# Patient Record
Sex: MALE | Race: WHITE | NOT HISPANIC OR LATINO | Employment: UNEMPLOYED | URBAN - METROPOLITAN AREA
[De-identification: names, ages, dates, MRNs, and addresses within clinical notes are randomized per-mention and may not be internally consistent; named-entity substitution may affect disease eponyms.]

---

## 2022-11-27 ENCOUNTER — ANESTHESIA (OUTPATIENT)
Dept: SURGERY | Facility: HOSPITAL | Age: 87
End: 2022-11-27
Payer: COMMERCIAL

## 2022-11-27 ENCOUNTER — ANESTHESIA EVENT (OUTPATIENT)
Dept: SURGERY | Facility: HOSPITAL | Age: 87
End: 2022-11-27
Payer: COMMERCIAL

## 2022-11-27 ENCOUNTER — HOSPITAL ENCOUNTER (OUTPATIENT)
Facility: HOSPITAL | Age: 87
Discharge: HOME | End: 2022-11-28
Attending: EMERGENCY MEDICINE | Admitting: INTERNAL MEDICINE
Payer: COMMERCIAL

## 2022-11-27 DIAGNOSIS — R10.31 RIGHT INGUINAL PAIN: ICD-10-CM

## 2022-11-27 DIAGNOSIS — N20.0 NEPHROLITHIASIS: ICD-10-CM

## 2022-11-27 DIAGNOSIS — N13.2 HYDRONEPHROSIS WITH URINARY OBSTRUCTION DUE TO URETERAL CALCULUS: ICD-10-CM

## 2022-11-27 DIAGNOSIS — N20.1 BILATERAL URETERAL CALCULI: Primary | ICD-10-CM

## 2022-11-27 PROBLEM — I48.20 CHRONIC ATRIAL FIBRILLATION: Status: ACTIVE | Noted: 2022-11-27

## 2022-11-27 PROBLEM — Z86.73 HISTORY OF STROKE: Status: ACTIVE | Noted: 2022-11-27

## 2022-11-27 LAB
ALBUMIN SERPL BCP-MCNC: 3.3 G/DL (ref 3.5–5.2)
ALP SERPL-CCNC: 76 U/L (ref 55–135)
ALT SERPL W/O P-5'-P-CCNC: 9 U/L (ref 10–44)
ANION GAP SERPL CALC-SCNC: 10 MMOL/L (ref 8–16)
ANION GAP SERPL CALC-SCNC: 14 MMOL/L (ref 8–16)
AST SERPL-CCNC: 17 U/L (ref 10–40)
BACTERIA #/AREA URNS HPF: ABNORMAL /HPF
BASOPHILS # BLD AUTO: 0.05 K/UL (ref 0–0.2)
BASOPHILS NFR BLD: 0.9 % (ref 0–1.9)
BILIRUB SERPL-MCNC: 0.7 MG/DL (ref 0.1–1)
BILIRUB UR QL STRIP: NEGATIVE
BUN SERPL-MCNC: 23 MG/DL (ref 10–30)
BUN SERPL-MCNC: 27 MG/DL (ref 6–30)
CALCIUM SERPL-MCNC: 10 MG/DL (ref 8.7–10.5)
CHLORIDE SERPL-SCNC: 104 MMOL/L (ref 95–110)
CHLORIDE SERPL-SCNC: 108 MMOL/L (ref 95–110)
CLARITY UR: CLEAR
CO2 SERPL-SCNC: 25 MMOL/L (ref 23–29)
COLOR UR: YELLOW
CREAT SERPL-MCNC: 0.9 MG/DL (ref 0.5–1.4)
CREAT SERPL-MCNC: 0.9 MG/DL (ref 0.5–1.4)
DIFFERENTIAL METHOD: ABNORMAL
EOSINOPHIL # BLD AUTO: 0.3 K/UL (ref 0–0.5)
EOSINOPHIL NFR BLD: 5.5 % (ref 0–8)
ERYTHROCYTE [DISTWIDTH] IN BLOOD BY AUTOMATED COUNT: 12.8 % (ref 11.5–14.5)
EST. GFR  (NO RACE VARIABLE): >60 ML/MIN/1.73 M^2
GLUCOSE SERPL-MCNC: 103 MG/DL (ref 70–110)
GLUCOSE SERPL-MCNC: 99 MG/DL (ref 70–110)
GLUCOSE UR QL STRIP: NEGATIVE
HCT VFR BLD AUTO: 39.7 % (ref 40–54)
HCT VFR BLD CALC: 39 %PCV (ref 36–54)
HGB BLD-MCNC: 13 G/DL (ref 14–18)
HGB UR QL STRIP: ABNORMAL
HYALINE CASTS #/AREA URNS LPF: 1 /LPF
IMM GRANULOCYTES # BLD AUTO: 0 K/UL (ref 0–0.04)
IMM GRANULOCYTES NFR BLD AUTO: 0 % (ref 0–0.5)
KETONES UR QL STRIP: NEGATIVE
LEUKOCYTE ESTERASE UR QL STRIP: NEGATIVE
LIPASE SERPL-CCNC: 23 U/L (ref 4–60)
LYMPHOCYTES # BLD AUTO: 1.8 K/UL (ref 1–4.8)
LYMPHOCYTES NFR BLD: 32.3 % (ref 18–48)
MCH RBC QN AUTO: 29.6 PG (ref 27–31)
MCHC RBC AUTO-ENTMCNC: 32.7 G/DL (ref 32–36)
MCV RBC AUTO: 90 FL (ref 82–98)
MICROSCOPIC COMMENT: ABNORMAL
MONOCYTES # BLD AUTO: 0.5 K/UL (ref 0.3–1)
MONOCYTES NFR BLD: 9.1 % (ref 4–15)
NEUTROPHILS # BLD AUTO: 2.9 K/UL (ref 1.8–7.7)
NEUTROPHILS NFR BLD: 52.2 % (ref 38–73)
NITRITE UR QL STRIP: NEGATIVE
NRBC BLD-RTO: 0 /100 WBC
PH UR STRIP: 6 [PH] (ref 5–8)
PLATELET # BLD AUTO: 206 K/UL (ref 150–450)
PMV BLD AUTO: 9.5 FL (ref 9.2–12.9)
POC IONIZED CALCIUM: 1.31 MMOL/L (ref 1.06–1.42)
POC TCO2 (MEASURED): 27 MMOL/L (ref 23–29)
POTASSIUM BLD-SCNC: 4 MMOL/L (ref 3.5–5.1)
POTASSIUM SERPL-SCNC: 4.3 MMOL/L (ref 3.5–5.1)
PROT SERPL-MCNC: 7.1 G/DL (ref 6–8.4)
PROT UR QL STRIP: ABNORMAL
RBC # BLD AUTO: 4.39 M/UL (ref 4.6–6.2)
RBC #/AREA URNS HPF: 85 /HPF (ref 0–4)
SAMPLE: NORMAL
SODIUM BLD-SCNC: 140 MMOL/L (ref 136–145)
SODIUM SERPL-SCNC: 143 MMOL/L (ref 136–145)
SP GR UR STRIP: 1.02 (ref 1–1.03)
URN SPEC COLLECT METH UR: ABNORMAL
UROBILINOGEN UR STRIP-ACNC: NEGATIVE EU/DL
WBC # BLD AUTO: 5.63 K/UL (ref 3.9–12.7)
WBC #/AREA URNS HPF: 10 /HPF (ref 0–5)

## 2022-11-27 PROCEDURE — 36000706: Performed by: STUDENT IN AN ORGANIZED HEALTH CARE EDUCATION/TRAINING PROGRAM

## 2022-11-27 PROCEDURE — 85014 HEMATOCRIT: CPT

## 2022-11-27 PROCEDURE — G0378 HOSPITAL OBSERVATION PER HR: HCPCS

## 2022-11-27 PROCEDURE — 82365 CALCULUS SPECTROSCOPY: CPT | Mod: 91 | Performed by: INTERNAL MEDICINE

## 2022-11-27 PROCEDURE — 85025 COMPLETE CBC W/AUTO DIFF WBC: CPT | Performed by: PHYSICIAN ASSISTANT

## 2022-11-27 PROCEDURE — 63600175 PHARM REV CODE 636 W HCPCS: Performed by: ANESTHESIOLOGY

## 2022-11-27 PROCEDURE — 99223 1ST HOSP IP/OBS HIGH 75: CPT | Mod: 57,,, | Performed by: STUDENT IN AN ORGANIZED HEALTH CARE EDUCATION/TRAINING PROGRAM

## 2022-11-27 PROCEDURE — C2617 STENT, NON-COR, TEM W/O DEL: HCPCS | Performed by: STUDENT IN AN ORGANIZED HEALTH CARE EDUCATION/TRAINING PROGRAM

## 2022-11-27 PROCEDURE — C1769 GUIDE WIRE: HCPCS | Performed by: STUDENT IN AN ORGANIZED HEALTH CARE EDUCATION/TRAINING PROGRAM

## 2022-11-27 PROCEDURE — 83690 ASSAY OF LIPASE: CPT | Performed by: PHYSICIAN ASSISTANT

## 2022-11-27 PROCEDURE — 88300 PR  SURG PATH,GROSS,LEVEL I: ICD-10-PCS | Mod: 26,,, | Performed by: PATHOLOGY

## 2022-11-27 PROCEDURE — 63600175 PHARM REV CODE 636 W HCPCS: Performed by: STUDENT IN AN ORGANIZED HEALTH CARE EDUCATION/TRAINING PROGRAM

## 2022-11-27 PROCEDURE — 27201423 OPTIME MED/SURG SUP & DEVICES STERILE SUPPLY: Performed by: STUDENT IN AN ORGANIZED HEALTH CARE EDUCATION/TRAINING PROGRAM

## 2022-11-27 PROCEDURE — D9220A PRA ANESTHESIA: Mod: CRNA,,, | Performed by: NURSE ANESTHETIST, CERTIFIED REGISTERED

## 2022-11-27 PROCEDURE — 25000003 PHARM REV CODE 250: Performed by: ANESTHESIOLOGY

## 2022-11-27 PROCEDURE — 37000009 HC ANESTHESIA EA ADD 15 MINS: Performed by: STUDENT IN AN ORGANIZED HEALTH CARE EDUCATION/TRAINING PROGRAM

## 2022-11-27 PROCEDURE — 82330 ASSAY OF CALCIUM: CPT

## 2022-11-27 PROCEDURE — 25000003 PHARM REV CODE 250: Performed by: NURSE ANESTHETIST, CERTIFIED REGISTERED

## 2022-11-27 PROCEDURE — D9220A PRA ANESTHESIA: Mod: ANES,,, | Performed by: ANESTHESIOLOGY

## 2022-11-27 PROCEDURE — 21400001 HC TELEMETRY ROOM

## 2022-11-27 PROCEDURE — 88300 SURGICAL PATH GROSS: CPT | Performed by: PATHOLOGY

## 2022-11-27 PROCEDURE — 63600175 PHARM REV CODE 636 W HCPCS: Performed by: NURSE ANESTHETIST, CERTIFIED REGISTERED

## 2022-11-27 PROCEDURE — 74420 UROGRAPHY RTRGR +-KUB: CPT | Mod: 26,,, | Performed by: STUDENT IN AN ORGANIZED HEALTH CARE EDUCATION/TRAINING PROGRAM

## 2022-11-27 PROCEDURE — 80053 COMPREHEN METABOLIC PANEL: CPT | Performed by: PHYSICIAN ASSISTANT

## 2022-11-27 PROCEDURE — 74420 PR  X-RAY RETROGRADE PYELOGRAM: ICD-10-PCS | Mod: 26,,, | Performed by: STUDENT IN AN ORGANIZED HEALTH CARE EDUCATION/TRAINING PROGRAM

## 2022-11-27 PROCEDURE — 36000707: Performed by: STUDENT IN AN ORGANIZED HEALTH CARE EDUCATION/TRAINING PROGRAM

## 2022-11-27 PROCEDURE — 82365 CALCULUS SPECTROSCOPY: CPT | Performed by: STUDENT IN AN ORGANIZED HEALTH CARE EDUCATION/TRAINING PROGRAM

## 2022-11-27 PROCEDURE — 84132 ASSAY OF SERUM POTASSIUM: CPT

## 2022-11-27 PROCEDURE — 99285 EMERGENCY DEPT VISIT HI MDM: CPT | Mod: 25

## 2022-11-27 PROCEDURE — 52356 CYSTO/URETERO W/LITHOTRIPSY: CPT | Mod: RT,,, | Performed by: STUDENT IN AN ORGANIZED HEALTH CARE EDUCATION/TRAINING PROGRAM

## 2022-11-27 PROCEDURE — 81000 URINALYSIS NONAUTO W/SCOPE: CPT | Performed by: PHYSICIAN ASSISTANT

## 2022-11-27 PROCEDURE — 82565 ASSAY OF CREATININE: CPT

## 2022-11-27 PROCEDURE — 99223 PR INITIAL HOSPITAL CARE,LEVL III: ICD-10-PCS | Mod: 57,,, | Performed by: STUDENT IN AN ORGANIZED HEALTH CARE EDUCATION/TRAINING PROGRAM

## 2022-11-27 PROCEDURE — 99900035 HC TECH TIME PER 15 MIN (STAT)

## 2022-11-27 PROCEDURE — 88300 SURGICAL PATH GROSS: CPT | Mod: 26,,, | Performed by: PATHOLOGY

## 2022-11-27 PROCEDURE — 87086 URINE CULTURE/COLONY COUNT: CPT | Performed by: STUDENT IN AN ORGANIZED HEALTH CARE EDUCATION/TRAINING PROGRAM

## 2022-11-27 PROCEDURE — 37000008 HC ANESTHESIA 1ST 15 MINUTES: Performed by: STUDENT IN AN ORGANIZED HEALTH CARE EDUCATION/TRAINING PROGRAM

## 2022-11-27 PROCEDURE — 84295 ASSAY OF SERUM SODIUM: CPT

## 2022-11-27 PROCEDURE — 52356 PR CYSTO/URETERO W/LITHOTRIPSY: ICD-10-PCS | Mod: RT,,, | Performed by: STUDENT IN AN ORGANIZED HEALTH CARE EDUCATION/TRAINING PROGRAM

## 2022-11-27 PROCEDURE — D9220A PRA ANESTHESIA: ICD-10-PCS | Mod: ANES,,, | Performed by: ANESTHESIOLOGY

## 2022-11-27 PROCEDURE — 71000033 HC RECOVERY, INTIAL HOUR: Performed by: STUDENT IN AN ORGANIZED HEALTH CARE EDUCATION/TRAINING PROGRAM

## 2022-11-27 PROCEDURE — C1758 CATHETER, URETERAL: HCPCS | Performed by: STUDENT IN AN ORGANIZED HEALTH CARE EDUCATION/TRAINING PROGRAM

## 2022-11-27 PROCEDURE — 63600175 PHARM REV CODE 636 W HCPCS: Performed by: INTERNAL MEDICINE

## 2022-11-27 PROCEDURE — 25000003 PHARM REV CODE 250: Performed by: INTERNAL MEDICINE

## 2022-11-27 PROCEDURE — 25500020 PHARM REV CODE 255: Performed by: STUDENT IN AN ORGANIZED HEALTH CARE EDUCATION/TRAINING PROGRAM

## 2022-11-27 PROCEDURE — D9220A PRA ANESTHESIA: ICD-10-PCS | Mod: CRNA,,, | Performed by: NURSE ANESTHETIST, CERTIFIED REGISTERED

## 2022-11-27 DEVICE — STENT URET PERCUFLEX 6FR 26CM: Type: IMPLANTABLE DEVICE | Site: URETER | Status: FUNCTIONAL

## 2022-11-27 DEVICE — STENT URET PERCUFLEX 6FR 24CM: Type: IMPLANTABLE DEVICE | Site: URETER | Status: FUNCTIONAL

## 2022-11-27 RX ORDER — FENTANYL CITRATE 50 UG/ML
INJECTION, SOLUTION INTRAMUSCULAR; INTRAVENOUS
Status: DISCONTINUED | OUTPATIENT
Start: 2022-11-27 | End: 2022-11-27

## 2022-11-27 RX ORDER — MUPIROCIN 20 MG/G
OINTMENT TOPICAL 2 TIMES DAILY
Status: DISCONTINUED | OUTPATIENT
Start: 2022-11-27 | End: 2022-11-28 | Stop reason: HOSPADM

## 2022-11-27 RX ORDER — SODIUM CHLORIDE 9 MG/ML
INJECTION, SOLUTION INTRAVENOUS CONTINUOUS
Status: DISCONTINUED | OUTPATIENT
Start: 2022-11-27 | End: 2022-11-28 | Stop reason: HOSPADM

## 2022-11-27 RX ORDER — ACETAMINOPHEN 10 MG/ML
1000 INJECTION, SOLUTION INTRAVENOUS ONCE
Status: COMPLETED | OUTPATIENT
Start: 2022-11-27 | End: 2022-11-27

## 2022-11-27 RX ORDER — HYDRALAZINE HYDROCHLORIDE 20 MG/ML
10 INJECTION INTRAMUSCULAR; INTRAVENOUS EVERY 6 HOURS PRN
Status: DISCONTINUED | OUTPATIENT
Start: 2022-11-27 | End: 2022-11-28 | Stop reason: HOSPADM

## 2022-11-27 RX ORDER — ONDANSETRON 2 MG/ML
4 INJECTION INTRAMUSCULAR; INTRAVENOUS EVERY 6 HOURS PRN
Status: DISCONTINUED | OUTPATIENT
Start: 2022-11-27 | End: 2022-11-28 | Stop reason: HOSPADM

## 2022-11-27 RX ORDER — SODIUM CHLORIDE 0.9 % (FLUSH) 0.9 %
10 SYRINGE (ML) INJECTION
Status: DISCONTINUED | OUTPATIENT
Start: 2022-11-27 | End: 2022-11-27 | Stop reason: HOSPADM

## 2022-11-27 RX ORDER — KETOROLAC TROMETHAMINE 30 MG/ML
15 INJECTION, SOLUTION INTRAMUSCULAR; INTRAVENOUS ONCE
Status: COMPLETED | OUTPATIENT
Start: 2022-11-27 | End: 2022-11-27

## 2022-11-27 RX ORDER — ROCURONIUM BROMIDE 10 MG/ML
INJECTION, SOLUTION INTRAVENOUS
Status: DISCONTINUED | OUTPATIENT
Start: 2022-11-27 | End: 2022-11-27

## 2022-11-27 RX ORDER — CEFAZOLIN SODIUM 1 G/3ML
INJECTION, POWDER, FOR SOLUTION INTRAMUSCULAR; INTRAVENOUS
Status: DISCONTINUED | OUTPATIENT
Start: 2022-11-27 | End: 2022-11-27

## 2022-11-27 RX ORDER — DEXAMETHASONE SODIUM PHOSPHATE 4 MG/ML
INJECTION, SOLUTION INTRA-ARTICULAR; INTRALESIONAL; INTRAMUSCULAR; INTRAVENOUS; SOFT TISSUE
Status: DISCONTINUED | OUTPATIENT
Start: 2022-11-27 | End: 2022-11-27

## 2022-11-27 RX ORDER — SUCCINYLCHOLINE CHLORIDE 20 MG/ML
INJECTION INTRAMUSCULAR; INTRAVENOUS
Status: DISCONTINUED | OUTPATIENT
Start: 2022-11-27 | End: 2022-11-27

## 2022-11-27 RX ORDER — ASPIRIN 81 MG/1
81 TABLET ORAL DAILY
Status: DISCONTINUED | OUTPATIENT
Start: 2022-11-27 | End: 2022-11-28 | Stop reason: HOSPADM

## 2022-11-27 RX ORDER — EPHEDRINE SULFATE 50 MG/ML
INJECTION, SOLUTION INTRAVENOUS
Status: DISCONTINUED | OUTPATIENT
Start: 2022-11-27 | End: 2022-11-27

## 2022-11-27 RX ORDER — OXYCODONE AND ACETAMINOPHEN 7.5; 325 MG/1; MG/1
1 TABLET ORAL EVERY 4 HOURS PRN
Status: DISCONTINUED | OUTPATIENT
Start: 2022-11-27 | End: 2022-11-28 | Stop reason: HOSPADM

## 2022-11-27 RX ORDER — PROPOFOL 10 MG/ML
VIAL (ML) INTRAVENOUS
Status: DISCONTINUED | OUTPATIENT
Start: 2022-11-27 | End: 2022-11-27

## 2022-11-27 RX ORDER — TAMSULOSIN HYDROCHLORIDE 0.4 MG/1
0.4 CAPSULE ORAL DAILY
Status: DISCONTINUED | OUTPATIENT
Start: 2022-11-27 | End: 2022-11-28 | Stop reason: HOSPADM

## 2022-11-27 RX ORDER — ATORVASTATIN CALCIUM 10 MG/1
20 TABLET, FILM COATED ORAL DAILY
Status: DISCONTINUED | OUTPATIENT
Start: 2022-11-27 | End: 2022-11-28 | Stop reason: HOSPADM

## 2022-11-27 RX ORDER — METHOCARBAMOL 500 MG/1
500 TABLET, FILM COATED ORAL ONCE
Status: COMPLETED | OUTPATIENT
Start: 2022-11-27 | End: 2022-11-27

## 2022-11-27 RX ORDER — ENOXAPARIN SODIUM 100 MG/ML
40 INJECTION SUBCUTANEOUS EVERY 24 HOURS
Status: DISCONTINUED | OUTPATIENT
Start: 2022-11-27 | End: 2022-11-27

## 2022-11-27 RX ORDER — PHENYLEPHRINE HYDROCHLORIDE 10 MG/ML
INJECTION INTRAVENOUS
Status: DISCONTINUED | OUTPATIENT
Start: 2022-11-27 | End: 2022-11-27

## 2022-11-27 RX ORDER — LIDOCAINE HYDROCHLORIDE 20 MG/ML
INJECTION INTRAVENOUS
Status: DISCONTINUED | OUTPATIENT
Start: 2022-11-27 | End: 2022-11-27

## 2022-11-27 RX ORDER — ONDANSETRON 2 MG/ML
INJECTION INTRAMUSCULAR; INTRAVENOUS
Status: DISCONTINUED | OUTPATIENT
Start: 2022-11-27 | End: 2022-11-27

## 2022-11-27 RX ADMIN — SUGAMMADEX 200 MG: 100 INJECTION, SOLUTION INTRAVENOUS at 03:11

## 2022-11-27 RX ADMIN — SUCCINYLCHOLINE CHLORIDE 100 MG: 20 INJECTION, SOLUTION INTRAMUSCULAR; INTRAVENOUS at 02:11

## 2022-11-27 RX ADMIN — PHENYLEPHRINE HYDROCHLORIDE 100 MCG: 10 INJECTION INTRAVENOUS at 03:11

## 2022-11-27 RX ADMIN — KETOROLAC TROMETHAMINE 15 MG: 30 INJECTION, SOLUTION INTRAMUSCULAR at 04:11

## 2022-11-27 RX ADMIN — EPHEDRINE SULFATE 5 MG: 50 INJECTION INTRAVENOUS at 03:11

## 2022-11-27 RX ADMIN — DEXAMETHASONE SODIUM PHOSPHATE 4 MG: 4 INJECTION, SOLUTION INTRAMUSCULAR; INTRAVENOUS at 02:11

## 2022-11-27 RX ADMIN — APIXABAN 2.5 MG: 2.5 TABLET, FILM COATED ORAL at 09:11

## 2022-11-27 RX ADMIN — EPHEDRINE SULFATE 10 MG: 50 INJECTION INTRAVENOUS at 02:11

## 2022-11-27 RX ADMIN — ROCURONIUM BROMIDE 25 MG: 10 INJECTION, SOLUTION INTRAVENOUS at 02:11

## 2022-11-27 RX ADMIN — PHENYLEPHRINE HYDROCHLORIDE 100 MCG: 10 INJECTION INTRAVENOUS at 02:11

## 2022-11-27 RX ADMIN — PROPOFOL 40 MG: 10 INJECTION, EMULSION INTRAVENOUS at 02:11

## 2022-11-27 RX ADMIN — FENTANYL CITRATE 50 MCG: 50 INJECTION, SOLUTION INTRAMUSCULAR; INTRAVENOUS at 02:11

## 2022-11-27 RX ADMIN — SODIUM CHLORIDE, SODIUM LACTATE, POTASSIUM CHLORIDE, AND CALCIUM CHLORIDE: .6; .31; .03; .02 INJECTION, SOLUTION INTRAVENOUS at 02:11

## 2022-11-27 RX ADMIN — MUPIROCIN: 20 OINTMENT TOPICAL at 09:11

## 2022-11-27 RX ADMIN — LIDOCAINE HYDROCHLORIDE 100 MG: 20 INJECTION, SOLUTION INTRAVENOUS at 02:11

## 2022-11-27 RX ADMIN — PHENYLEPHRINE HYDROCHLORIDE 200 MCG: 10 INJECTION INTRAVENOUS at 03:11

## 2022-11-27 RX ADMIN — PIPERACILLIN AND TAZOBACTAM 4.5 G: 4; .5 INJECTION, POWDER, LYOPHILIZED, FOR SOLUTION INTRAVENOUS; PARENTERAL at 09:11

## 2022-11-27 RX ADMIN — CEFAZOLIN SODIUM 2 G: 1 POWDER, FOR SOLUTION INTRAMUSCULAR; INTRAVENOUS at 02:11

## 2022-11-27 RX ADMIN — EPHEDRINE SULFATE 5 MG: 50 INJECTION INTRAVENOUS at 02:11

## 2022-11-27 RX ADMIN — ONDANSETRON 4 MG: 2 INJECTION, SOLUTION INTRAMUSCULAR; INTRAVENOUS at 02:11

## 2022-11-27 RX ADMIN — METHOCARBAMOL 500 MG: 500 TABLET ORAL at 04:11

## 2022-11-27 RX ADMIN — SODIUM CHLORIDE: 0.9 INJECTION, SOLUTION INTRAVENOUS at 06:11

## 2022-11-27 RX ADMIN — EPHEDRINE SULFATE 10 MG: 50 INJECTION INTRAVENOUS at 03:11

## 2022-11-27 RX ADMIN — ACETAMINOPHEN 1000 MG: 10 INJECTION INTRAVENOUS at 04:11

## 2022-11-27 NOTE — SUBJECTIVE & OBJECTIVE
Past Medical History:   Diagnosis Date    A-fib     CVA (cerebral vascular accident)     Kidney stone        History reviewed. No pertinent surgical history.    Review of patient's allergies indicates:  No Known Allergies    No current facility-administered medications on file prior to encounter.     No current outpatient medications on file prior to encounter.     Family History    None       Tobacco Use    Smoking status: Never    Smokeless tobacco: Never   Substance and Sexual Activity    Alcohol use: Not Currently    Drug use: Never    Sexual activity: Not on file     Review of Systems   Constitutional:  Negative for activity change, appetite change, chills, diaphoresis and fever.   HENT:  Negative for congestion.    Eyes:  Negative for discharge and itching.   Respiratory:  Negative for apnea and chest tightness.    Cardiovascular:  Negative for chest pain and leg swelling.   Gastrointestinal:  Negative for abdominal distention and anal bleeding.   Endocrine: Negative for cold intolerance.   Genitourinary:  Negative for difficulty urinating and dysuria.   Musculoskeletal:  Negative for arthralgias and back pain.   Neurological:  Negative for dizziness and facial asymmetry.   Psychiatric/Behavioral:  Negative for agitation and behavioral problems.    Objective:     Vital Signs (Most Recent):  Temp: 97.5 °F (36.4 °C) (11/27/22 1045)  Pulse: 70 (11/27/22 1330)  Resp: 18 (11/27/22 1045)  BP: (!) 145/67 (11/27/22 1303)  SpO2: 98 % (11/27/22 1330)   Vital Signs (24h Range):  Temp:  [97.5 °F (36.4 °C)] 97.5 °F (36.4 °C)  Pulse:  [65-87] 70  Resp:  [18] 18  SpO2:  [95 %-99 %] 98 %  BP: (130-149)/(61-80) 145/67     Weight: 64.4 kg (142 lb)  Body mass index is 22.92 kg/m².    Physical Exam  Vitals and nursing note reviewed.   Constitutional:       General: He is not in acute distress.     Appearance: Normal appearance. He is not ill-appearing or toxic-appearing.   HENT:      Head: Normocephalic and atraumatic.       Mouth/Throat:      Pharynx: Oropharynx is clear.   Eyes:      Conjunctiva/sclera: Conjunctivae normal.   Cardiovascular:      Rate and Rhythm: Regular rhythm.   Pulmonary:      Effort: Pulmonary effort is normal. No respiratory distress.   Skin:     General: Skin is dry.   Neurological:      Mental Status: He is alert and oriented to person, place, and time.   Psychiatric:         Behavior: Behavior normal.           Significant Labs: All pertinent labs within the past 24 hours have been reviewed.  BMP:   Recent Labs   Lab 11/27/22  1109         K 4.3      CO2 25   BUN 23   CREATININE 0.9   CALCIUM 10.0     CBC:   Recent Labs   Lab 11/27/22  1109 11/27/22  1114   WBC 5.63  --    HGB 13.0*  --    HCT 39.7* 39     --        Significant Imaging:

## 2022-11-27 NOTE — ASSESSMENT & PLAN NOTE
Non obstructing L renal stone  R obstructing ureteral stone with hydronephrosis down to the ureterovesical junction  Discussed treatment of R ureteral stone with stent placement bilaterally  Discussed left renal stone can be managed at a later date  Urine culture  Informed consent  Plan for cystoscopy bilateral retrograde pyelogram, right ureteroscopic stone removal, bilateral stent placement

## 2022-11-27 NOTE — CONSULTS
[facility] - [place], [place] Emergency Dept  Urology  Consult Note    Patient Name: John Puri  MRN: [id number]  Admission Date: 11/27/2022  Hospital Length of Stay: 0   Code Status: No Order   Attending Provider: Lb Godinez MD   Consulting Provider: Noelle Heredia MD  Primary Care Physician: No primary care provider on file.  Principal Problem:Renal calculus    Inpatient consult to Urology  Consult performed by: Noelle Heredia MD  Consult ordered by: Pedro Dee MD  Reason for consult: urolithiasis        Subjective:     HPI:  89 yo man presents to ED with R hip pain. Found to have obstructing R distal ureteral stone for which urology was consulted. He has preserved renal function, no evidence of infection or sepsis. UA without infection. Patient also found to have Left non obstructing stone.   Hx of CVA w/ R sided deficits, afib on aspirin/apixiban  Last meal at 8am approximately      Past Medical History:   Diagnosis Date    A-fib     CVA (cerebral vascular accident)     Kidney stone        History reviewed. No pertinent surgical history.    Review of patient's allergies indicates:  No Known Allergies    Family History    None         Tobacco Use    Smoking status: Never    Smokeless tobacco: Never   Substance and Sexual Activity    Alcohol use: Not Currently    Drug use: Never    Sexual activity: Not on file       Review of Systems   Constitutional:  Negative for activity change, appetite change, chills, diaphoresis and fever.   HENT:  Negative for congestion and rhinorrhea.    Eyes:  Negative for visual disturbance.   Respiratory:  Negative for choking and shortness of breath.    Gastrointestinal:  Positive for abdominal pain. Negative for abdominal distention, constipation, diarrhea, nausea and vomiting.   Genitourinary:  Negative for difficulty urinating, dysuria, flank pain, hematuria, scrotal swelling, testicular pain and urgency.   Skin:  Negative for color change, pallor and wound.   Neurological:   Positive for weakness. Negative for dizziness and syncope.   Psychiatric/Behavioral:  Negative for confusion and hallucinations.      Objective:     Temp:  [97.5 °F (36.4 °C)] 97.5 °F (36.4 °C)  Pulse:  [65-87] 68  Resp:  [18] 18  SpO2:  [95 %-99 %] 96 %  BP: (130-149)/(61-80) 145/67     Body mass index is 22.92 kg/m².           Drains       None                   Physical Exam  Constitutional:       General: He is not in acute distress.     Appearance: He is well-developed. He is not ill-appearing, toxic-appearing or diaphoretic.   HENT:      Head: Normocephalic and atraumatic.      Mouth/Throat:      Mouth: Mucous membranes are moist.   Eyes:      Conjunctiva/sclera: Conjunctivae normal.   Cardiovascular:      Rate and Rhythm: Normal rate.   Pulmonary:      Effort: Pulmonary effort is normal. No respiratory distress.   Abdominal:      General: There is no distension.      Palpations: Abdomen is soft. There is no mass.      Tenderness: There is no abdominal tenderness. There is no right CVA tenderness, left CVA tenderness or guarding.   Musculoskeletal:         General: No swelling or deformity.      Cervical back: Neck supple.   Skin:     General: Skin is warm.      Capillary Refill: Capillary refill takes less than 2 seconds.      Findings: No rash.   Neurological:      Mental Status: He is alert and oriented to person, place, and time.   Psychiatric:         Mood and Affect: Mood normal.         Thought Content: Thought content normal.         Judgment: Judgment normal.       Significant Labs:    BMP:  Recent Labs   Lab 11/27/22  1109      K 4.3      CO2 25   BUN 23   CREATININE 0.9   CALCIUM 10.0       CBC:  Recent Labs   Lab 11/27/22  1109 11/27/22  1114   WBC 5.63  --    HGB 13.0*  --    HCT 39.7* 39     --        Significant Imaging:  Narrative & Impression  EXAMINATION:  CT ABDOMEN PELVIS WITHOUT CONTRAST     CLINICAL HISTORY:  RLQ abdominal pain (Age >= 14y);Right inguinal pain;      TECHNIQUE:  Low dose axial images, sagittal and coronal reformations were obtained from the lung bases to the pubic symphysis.  No contrast media was utilized.     COMPARISON:  None     FINDINGS:  Lack of IV contrast limits evaluation of soft tissue and vascular structures.     Imaged lung bases show mild dependent atelectasis, scattered areas of platelike scarring versus atelectasis, mild bronchiectasis, subpleural reticulation bilaterally and a few small peripheral cysts some of which appear stacked within the right lower lobe suggesting honeycombing.  There are several scattered small solid pulmonary nodules with the largest measuring 5 mm within the posterolateral aspect of the lingula.  No large consolidation or pleural effusion.     Heart is mildly enlarged without significant pericardial fluid noting multi-vessel advanced coronary arterial calcific atherosclerosis and aortic annular and aortic valvular calcifications.     Small hiatal hernia.  There is a suspected small duodenal diverticulum at the C-loop.  Duodenum is otherwise within normal limits.     Right upper quadrant colonic interposition.  There is a solitary 1.4 cm dependent stone within the gallbladder without acute cholecystitis.  Pancreas is mildly atrophic without focal process or adjacent inflammatory change.  Liver is normal in size without focal process seen.  Spleen is somewhat small in size without focal process seen.  Bilateral adrenal glands are within normal limits.  No biliary ductal dilatation.     Bilateral kidneys are normal in location.  There is moderate to severe right-sided hydroureteronephrosis secondary to a 7-8 mm calculus within the distal right ureter at the level of the acetabulum.  There is suspected bilateral mild cortical thinning, allowing for lack of IV contrast.  The right kidney is slightly longer in length than the left presumably related to hydronephrosis.  Left kidney is normal in length.  There is bilateral  mild cortical scarring.  Bilateral mild nonspecific perinephric stranding slightly more prominent on the right.  Few scattered punctate nephroliths at the right renal upper pole.  There is minimal left-sided hydronephrosis noting a 1.7 cm calculus within the left renal pelvis near the ureteropelvic junction (UPJ).  A few scattered small nephroliths throughout the left kidney with the largest measuring 2 mm at the upper pole.  No radiodense calculus seen within the left ureter or urinary bladder.  Left ureter is normal in course and caliber.     Urinary bladder is well distended without wall thickening.  Prostate is normal in size noting dystrophic calcifications in the central gland.  A few scattered pelvic phleboliths noted.     Appendix and terminal ileum are within normal limits.  Moderate amount of scattered fecal material throughout the colon.  Several scattered colonic diverticula without acute diverticulitis.  No bowel obstruction or acute inflammation.  No pneumatosis or portal venous gas.     No ascites, free air or lymphadenopathy.     Advanced scattered calcific atherosclerosis of the aorta extending into its mesenteric and iliac branches.  The aorta is mildly tortuous but nonaneurysmal.     Small fat containing umbilical hernia and small fat containing right inguinal hernia.  Small focus of subcutaneous stranding with a somewhat bandlike configuration within the right gluteal subcutaneous fat likely sequela of remote trauma or surgery.  No fluid collection or radiodense retained foreign body within the surrounding extraperitoneal soft tissues.     Osseous structures show generalized osteopenia, levocurvature of the lumbar spine, moderate to advanced degenerative changes of the imaged thoracic and lumbar spine, degenerative related grade 1 anterolisthesis of 4th on 5th of total 6 lumbar type vertebral bodies, and multilevel chronic appearing minimal to mild anterior wedge deformity of several mid to lower  thoracic and also upper lumbar vertebral bodies.  No acute or destructive osseous process seen.     Impression:     1. Moderate to severe right-sided hydroureteronephrosis secondary to a 7-8 mm calculus within the distal right ureter.  Minimal left-sided hydronephrosis likely secondary to a 1.7 cm within the renal pelvis near the UPJ.  Bilateral nephrolithiasis.  2. Cholelithiasis without acute cholecystitis.  3. Diverticulosis coli.  4. Advanced systemic and coronary calcific atherosclerosis.  5. Bibasilar suspected chronic interstitial lung changes/pulmonary fibrosis without consolidative process.  There is also peribronchial thickening with bronchiectasis which can be seen with recent or remote/chronic small airways infectious or inflammatory process, with small component of pulmonary edema not excluded.  6. Bibasilar several small solid pulmonary nodules measuring up to 5 mm.  For multiple solid nodules all <6 mm, Fleischner Society 2017 guidelines recommend no routine follow up for a low risk patient, or follow up with non-contrast chest CT at 12 months after discovery in a high risk patient.  7. Additional findings as above.  This report was flagged in Epic as abnormal.  This report was flagged in Epic as containing an incidental finding.        Electronically signed by: Maxim Rene MD  Date:                                            11/27/2022  Time:                                           12:32                  Assessment and Plan:     Right ureteral stone  Non obstructing L renal stone  R obstructing ureteral stone with hydronephrosis down to the ureterovesical junction  Discussed treatment of R ureteral stone with stent placement bilaterally  Discussed left renal stone can be managed at a later date  Urine culture  Informed consent  Plan for cystoscopy bilateral retrograde pyelogram, right ureteroscopic stone removal, bilateral stent placement      VTE Risk Mitigation (From admission, onward)            Ordered     apixaban tablet 2.5 mg  2 times daily         11/27/22 4148                    Thank you for your consult. I will follow-up with patient. Please contact us if you have any additional questions.    Noelle Heredia MD  Urology  [facility] - [place] - Emergency Dept

## 2022-11-27 NOTE — OP NOTE
DATE OF PROCEDURE: 11/27/2022     PREOPERATIVE DIAGNOSIS: Bilateral nephrolithiasis, right ureteral stone, hydronephrosis secondary to ureteral obstruction, chronic anticoagulation      POSTOPERATIVE DIAGNOSIS: as above     PROCEDURE PERFORMED:    Cystoscopy with Bilateral retrograde pyelogram,   Right ureteroscopic stone removal with  laser lithotripsy, Rightstone manipulation and extraction  Bilateral ureteral stent placement, Fluoroscopy      PRIMARY SURGEON:  Noelle Heredia MD  Assistant: None     ANESTHESIA:  General.     ESTIMATED BLOOD LOSS:  Minimal, < 2cc     DRAINS:    Right- 6 fr x 26 cm ureteral stent  Left- 6 fr x 24 cm ureteral stent  No tethers  .     SPECIMENS REMOVED:  Right renal stone, right ureteral stone, urine culture     COMPLICATIONS:  None.     INDICATION:  89 y.o. patient with a history of bilateral nephrolithiasis, right ureteral stone with obstruction, presented with R groin pain, fond to have aforementioned urolithiasis. Informed consent obtained. Patient informed R ureteral stone located in the distal ureter to be treated, he will need definitive management of the large left renal stone at a later date- a stent will be placed in the event the left renal stone becomes obstructing. Informed consent reviewed. Patient on blood thinners due to atrial fibrillation      PROCEDURE DETAILS: Patient was taken to the Operating Room where he was positively   identified by armband.  He was placed supine on the operating room table.  Following induction of adequate general anesthesia, he was placed in the dorsal lithotomy position and his external genitalia were prepped and draped in the usual sterile fashion. A preoperative timeout was performed as well as confirmation of preoperative antibiotics and preoperative marking for treatment of the right ureteral stone.      A  film was taken using fluoroscopy.The left renal stone was radio-opaque, the right distal ureteral stone was seen as well.     Patient with meatal narrowing, dilated from 17 fr to 26 fr to accommodate the urinary instruments. A 22-Namibian rigid cystoscope was then passed through the urethra into the bladder under direct vision.  There were no urethral lesions, strictures seen.  No bladder lesions seen.  The prostate was seen to be mildly hypertrophied. Once into the bladder, the bladder was inspected.  There were no tumors seen.  No lesions seen.  Both ureteral orifices were identified.  They were seen to be in their orthotopic position.  I then used a 5-Namibian open-ended catheter to intubate the Right ureteral orifice.  Retrograde pyelogram was taken which showed filling defects related to the distal ureteral stones as well as hydronephrosis.  I then passed a 0.035 inch zip wire.  This was passed through the open-ended catheter up to the level of the kidney.  The open-ended   catheter and scope were withdrawn leaving the wire in place. A semi-rigid ureteroscope was advanced alongside the wire into the  right ureteral orifice. The ureter was evaluated for stones which were located just beyond the ureteral orifice, mildy impacted. I then passed a 365 micron Holmium laser fiber through the scope.  Using the laser I fragmented the stone into smaller pieces. I then used a ZeroTip Nitinol basket to grasp the stone framents.  This was then brought out and was then sent off for stone analysis.I was only able to advance the ureteroscope to the mid ureter, so I   I advanced a second 0.035 wire, Bentson. I removed the rigid ureteroscope leaving both wires in place.  I then advanced a digital flexible ureteroscope into the ureter over the bentson wire, the bentson wire was removed to allow for    The ureter to be inspected up to the level of the renal pelvis. Additional stones were fractured and retrieved for analysis.  All calices were inspected for stones, in the upper pole, two stones was visualized, they were lasered and sent for analysis. The  scope was then passed one last time into the kidney to fully inspect the kidney again no other stones were seen.  The ureter was inspected.  The scope was withdrawn under direct vision along with the scope.  There was no stone seen within the ureter and the ureter showed no injury and mild edema at the level of the distal ureter where the stones were initially seen suggesting impaction.     I then planned to leave a stent with string bilaterally. I then advanced a 6-Malay 26 cm double-J stent with string over the wire, deploying a coil within the Right kidney and a coil within the bladder.  This was confirmed on fluoroscopy.  I then turned my attention to the left ureteral orifice, I advanced a zip wire into the renal pelvis, I performed a retrograde pyelogram, I placed the left ureteral stent into the left renal pelvis.I left a gill catheter, 16 fr, secured with 10 cc sterile water.        Anesthesia was then reversed.  He was taken to Recovery in room in stable condition.    Patient to be monitored on the hospitalist service

## 2022-11-27 NOTE — ED TRIAGE NOTES
Pt arrived to ED with c/o R flank pain that radiates to stomach and down to R groin x4days. Pt denies any urinary s/s or N/V. Hx kidney stones. Pt reports R sided weakness from previous cva. NAD.

## 2022-11-27 NOTE — H&P
"[facility] Medicine  History & Physical    Patient Name: John Puri  MRN: [id number]  Patient Class: IP- Inpatient  Admission Date: 11/27/2022  Attending Physician: Lb Godinez MD   Primary Care Provider: No primary care provider on file.         Patient information was obtained from patient and ER records.     Subjective:     Principal Problem:Renal calculus    Chief Complaint:   Chief Complaint   Patient presents with    Groin Pain     Pt reports to the ED with C/O right sided groin/ flank pain x 4 days. Pt denies any changes in urination. Previous Hx of CVA with right sided weakness. Pt reports "I usually cant feel much on my right side so I am surprised it hurts so bad." Pt does has a hx of renal stones. Pt placed in ED bed for eval.         HPI: Patient presents with R groin pain for 4 days. Known history of renal calculi in the past.  CT finds bilateral calculi with hydronephrosis.  Urology has been consulted for intervention today.  No other complaints       Past Medical History:   Diagnosis Date    A-fib     CVA (cerebral vascular accident)     Kidney stone        History reviewed. No pertinent surgical history.    Review of patient's allergies indicates:  No Known Allergies    No current facility-administered medications on file prior to encounter.     No current outpatient medications on file prior to encounter.     Family History    None       Tobacco Use    Smoking status: Never    Smokeless tobacco: Never   Substance and Sexual Activity    Alcohol use: Not Currently    Drug use: Never    Sexual activity: Not on file     Review of Systems   Constitutional:  Negative for activity change, appetite change, chills, diaphoresis and fever.   HENT:  Negative for congestion.    Eyes:  Negative for discharge and itching.   Respiratory:  Negative for apnea and chest tightness.    Cardiovascular:  Negative for chest pain and leg swelling.   Gastrointestinal:  Negative " for abdominal distention and anal bleeding.   Endocrine: Negative for cold intolerance.   Genitourinary:  Negative for difficulty urinating and dysuria.   Musculoskeletal:  Negative for arthralgias and back pain.   Neurological:  Negative for dizziness and facial asymmetry.   Psychiatric/Behavioral:  Negative for agitation and behavioral problems.    Objective:     Vital Signs (Most Recent):  Temp: 97.5 °F (36.4 °C) (11/27/22 1045)  Pulse: 70 (11/27/22 1330)  Resp: 18 (11/27/22 1045)  BP: (!) 145/67 (11/27/22 1303)  SpO2: 98 % (11/27/22 1330)   Vital Signs (24h Range):  Temp:  [97.5 °F (36.4 °C)] 97.5 °F (36.4 °C)  Pulse:  [65-87] 70  Resp:  [18] 18  SpO2:  [95 %-99 %] 98 %  BP: (130-149)/(61-80) 145/67     Weight: 64.4 kg (142 lb)  Body mass index is 22.92 kg/m².    Physical Exam  Vitals and nursing note reviewed.   Constitutional:       General: He is not in acute distress.     Appearance: Normal appearance. He is not ill-appearing or toxic-appearing.   HENT:      Head: Normocephalic and atraumatic.      Mouth/Throat:      Pharynx: Oropharynx is clear.   Eyes:      Conjunctiva/sclera: Conjunctivae normal.   Cardiovascular:      Rate and Rhythm: Regular rhythm.   Pulmonary:      Effort: Pulmonary effort is normal. No respiratory distress.   Skin:     General: Skin is dry.   Neurological:      Mental Status: He is alert and oriented to person, place, and time.   Psychiatric:         Behavior: Behavior normal.           Significant Labs: All pertinent labs within the past 24 hours have been reviewed.  BMP:   Recent Labs   Lab 11/27/22  1109         K 4.3      CO2 25   BUN 23   CREATININE 0.9   CALCIUM 10.0     CBC:   Recent Labs   Lab 11/27/22  1109 11/27/22  1114   WBC 5.63  --    HGB 13.0*  --    HCT 39.7* 39     --        Significant Imaging:     Assessment/Plan:     * Renal calculus  Bilateral. L > R with hydro.  Will have stent placement today. Home tomorrow. L stent.       Chronic  atrial fibrillation  Patient with Persistent (7 days or more) atrial fibrillation which is controlled currently with no meds. Patient is currently in atrial fibrillation.[id number] Score: 2. HASBLED Score: 3  Anticoagulation indicated. Anticoagulation done with Eliquis .        History of stroke  No acute issues.         VTE Risk Mitigation (From admission, onward)         Ordered     apixaban tablet 2.5 mg  2 times daily         11/27/22 8205                   Lb Plascencia MD  Department of Hospital Medicine   [facility] - [place] - Emergency Dept

## 2022-11-27 NOTE — ANESTHESIA PREPROCEDURE EVALUATION
11/27/2022  John Puri is a 89 y.o., male  For cysto.  Pt has hx of afib and CVA with residual right sided weakness.  He currently takes eliquis and ASA.    Past Medical History:   Diagnosis Date    A-fib     CVA (cerebral vascular accident)     Kidney stone            Pre-op Assessment    I have reviewed the Patient Summary Reports.     I have reviewed the Nursing Notes.    I have reviewed the Medications.     Review of Systems  Anesthesia Hx:  No problems with previous Anesthesia Denies Hx of Anesthetic complications  Neg history of prior surgery. Denies Family Hx of Anesthesia complications.   Denies Personal Hx of Anesthesia complications.   Social:  Non-Smoker, No Alcohol Use    Hematology/Oncology:  Hematology Normal   Oncology Normal     EENT/Dental:EENT/Dental Normal   Cardiovascular:  Cardiovascular Normal Exercise tolerance: good     Pulmonary:  Pulmonary Normal    Renal/:   renal calculi    Hepatic/GI:  Hepatic/GI Normal    Musculoskeletal:  Musculoskeletal Normal    Neurological:   CVA, residual symptoms    Endocrine:  Endocrine Normal    Dermatological:  Skin Normal    Psych:  Psychiatric Normal         Lab Results   Component Value Date    WBC 5.63 11/27/2022    HGB 13.0 (L) 11/27/2022    HCT 39 11/27/2022    MCV 90 11/27/2022     11/27/2022         Chemistry        Component Value Date/Time     11/27/2022 1109    K 4.3 11/27/2022 1109     11/27/2022 1109    CO2 25 11/27/2022 1109    BUN 23 11/27/2022 1109    CREATININE 0.9 11/27/2022 1109     11/27/2022 1109        Component Value Date/Time    CALCIUM 10.0 11/27/2022 1109    ALKPHOS 76 11/27/2022 1109    AST 17 11/27/2022 1109    ALT 9 (L) 11/27/2022 1109    BILITOT 0.7 11/27/2022 1109            Physical Exam  General: Well nourished    Airway:  Mallampati: II   Mouth Opening: Normal  Tongue: Normal  Neck  ROM: Normal ROM    Dental:  Dentures    Chest/Lungs:  Clear to auscultation    Heart:  Rate: Normal  Rhythm: Regular Rhythm  Sounds: Normal        Anesthesia Plan  Type of Anesthesia, risks & benefits discussed:    Anesthesia Type: Gen ETT  Intra-op Monitoring Plan: Standard ASA Monitors  Induction:  IV  Informed Consent: Informed consent signed with the Patient and all parties understand the risks and agree with anesthesia plan.  All questions answered. Patient consented to blood products? Yes  ASA Score: 2    Ready For Surgery From Anesthesia Perspective.     .

## 2022-11-27 NOTE — HPI
Patient presents with R groin pain for 4 days. Known history of renal calculi in the past.  CT finds bilateral calculi with hydronephrosis.  Urology has been consulted for intervention today.  No other complaints

## 2022-11-27 NOTE — TRANSFER OF CARE
"Anesthesia Transfer of Care Note    Patient: John Puri    Procedure(s) Performed: Procedure(s) (LRB):  CYSTOSCOPY, WITH RETROGRADE PYELOGRAM AND URETERAL STENT INSERTION (Bilateral)  REMOVAL, CALCULUS, URETER, URETEROSCOPIC (Right)    Patient location: PACU    Anesthesia Type: general    Transport from OR: Transported from OR on room air with adequate spontaneous ventilation    Post pain: adequate analgesia    Post assessment: no apparent anesthetic complications and tolerated procedure well    Post vital signs: stable    Level of consciousness: awake and alert    Nausea/Vomiting: no nausea/vomiting    Complications: none    Transfer of care protocol was followed      Last vitals:   Visit Vitals  BP (!) 143/68 (BP Location: Right arm)   Pulse 94   Temp 36.4 °C (97.5 °F) (Temporal)   Resp 15   Ht 5' 6" (1.676 m)   Wt 64.4 kg (142 lb)   SpO2 100%   BMI 22.92 kg/m²     "

## 2022-11-27 NOTE — BRIEF OP NOTE
[facility] - Surgery  Brief Operative Note    SUMMARY     Surgery Date: 11/27/2022     Surgeon(s) and Role:     * Noelle Heredia MD - Primary    Assisting Surgeon: None    Pre-op Diagnosis:  Nephrolithiasis [N20.0]  Hydronephrosis with urinary obstruction due to ureteral calculus [N13.2]    Post-op Diagnosis:  Post-Op Diagnosis Codes:     * Nephrolithiasis [N20.0]     * Hydronephrosis with urinary obstruction due to ureteral calculus [N13.2]    Procedure(s) (LRB):  CYSTOSCOPY, WITH RETROGRADE PYELOGRAM AND URETERAL STENT INSERTION (Bilateral)  REMOVAL, CALCULUS, URETER, URETEROSCOPIC (Right)  Fluoroscopy    Anesthesia: General/MAC    Operative Findings:   R ureteral and renal stones treated with laser, basket stone removal , sent for analysis, right stent placed- 6 fr x 26 cm no tether  Left ureteral stent placed- no tether, 6 fr x 24 cm    Estimated Blood Loss: < 2cc    Estimated Blood Loss has been documented.         Specimens:   Specimen (24h ago, onward)       Start     Ordered    11/27/22 1541  Specimen to Pathology, Surgery Urology  Once        Comments: Pre-op Diagnosis: Nephrolithiasis [N20.0]Hydronephrosis with urinary obstruction due to ureteral calculus [N13.2]Procedure(s):CYSTOSCOPY, WITH RETROGRADE PYELOGRAM AND URETERAL STENT INSERTIONREMOVAL, CALCULUS, URETER, URETEROSCOPIC Number of specimens: 2Name of specimens: 1. Right ureteral stone 2. Right renal stone     References:    Click here for ordering Quick Tip   Question Answer Comment   Procedure Type: Urology    Specimen Class: Routine/Screening    Which provider would you like to cc? NOELLE HEREDIA    Release to patient Immediate        11/27/22 1541                    [id number]

## 2022-11-27 NOTE — ED PROVIDER NOTES
"Encounter Date: 11/27/2022       History     Chief Complaint   Patient presents with    Groin Pain     Pt reports to the ED with C/O right sided groin/ flank pain x 4 days. Pt denies any changes in urination. Previous Hx of CVA with right sided weakness. Pt reports "I usually cant feel much on my right side so I am surprised it hurts so bad." Pt does has a hx of renal stones. Pt placed in ED bed for eval.      HPI  This 89-year-old white male presents to the emergency room complaining of right lower quadrant right inguinal right proximal medial thigh pain, not particularly sharper worse with movement.  The patient's pain tends to be worse when he is seated in eating.  He denies nausea vomiting diarrhea painful urination dark urine.  He is otherwise well there is no history of trauma.  Notes no specific pain with range of motion of his hip.  There is no swelling in the right inguinal region.  He has a history of stroke.  He reports that he has very little feeling on the right side of his body.  The family is curious that he feels pain at this location.  Review of patient's allergies indicates:  No Known Allergies  Past Medical History:   Diagnosis Date    A-fib     CVA (cerebral vascular accident)     Kidney stone      History reviewed. No pertinent surgical history.  History reviewed. No pertinent family history.  Social History     Tobacco Use    Smoking status: Never    Smokeless tobacco: Never   Substance Use Topics    Alcohol use: Not Currently    Drug use: Never     Review of Systems  The patient was questioned specifically with regard to the following.  General: Fever, chills, sweats. Neuro: Headache. Eyes: eye problems. ENT: Ear pain, sore throat. Cardiovascular: Chest pain. Respiratory: Cough, shortness of breath. Gastrointestinal: Abdominal pain, vomiting, diarrhea. Genitourinary: Painful urination.  Musculoskeletal: Arm and leg problems. Skin: Rash.  The review of systems was negative except for the " following:  Right lower quadrant, right inguinal, right medial thigh pain.  The scrotum is not affected.  There is no testicular pain.  There is no swelling.  There is no history of trauma or fever.  There is no back or flank pain.  The patient does have a history of ureteral calculi.  Physical Exam     Initial Vitals [11/27/22 1045]   BP Pulse Resp Temp SpO2   130/61 87 18 97.5 °F (36.4 °C) 98 %      MAP       --         Physical Exam  The patient was examined specifically for the following:   General:No significant distress, Good color, Warm and dry. Head and neck:Scalp atraumatic, Neck supple. Neurological:Appropriate conversation, Gross motor deficits. Eyes:Conjugate gaze, Clear corneas. ENT: No epistaxis. Cardiac: Regular rate and rhythm, Grossly normal heart tones. Pulmonary: Wheezing, Rales. Gastrointestinal: Abdominal tenderness, Abdominal distention. Musculoskeletal: Extremity deformity, Apparent pain with range of motion of the joints. Skin: Rash.   The findings on examination were normal except for the following:  There may be some very vague tenderness in the right inguinal region.  There was no hernia.  There is no scrotal tenderness.  There is no costovertebral angle tenderness.  There is very little right lower quadrant tenderness.  There is no real guarding rebound mass or distention.  There is no significant pain with range of motion of the right hip.  ED Course   Critical Care    Date/Time: 11/27/2022 1:19 PM  Performed by: Pedro Dee MD  Authorized by: Pedro Dee MD   Direct patient critical care time: 22 minutes  Additional history critical care time: 11 minutes  Ordering / reviewing critical care time: 11 minutes  Documentation critical care time: 11 minutes  Consulting other physicians critical care time: 11 minutes  Total critical care time (exclusive of procedural time) : 66 minutes  Critical care time was exclusive of separately billable procedures and treating other patients and  teaching time.  Critical care was necessary to treat or prevent imminent or life-threatening deterioration of the following conditions: metabolic crisis.  Critical care was time spent personally by me on the following activities: development of treatment plan with patient or surrogate, discussions with consultants, evaluation of patient's response to treatment, examination of patient, obtaining history from patient or surrogate, ordering and performing treatments and interventions, ordering and review of laboratory studies, ordering and review of radiographic studies, pulse oximetry, re-evaluation of patient's condition and review of old charts.      Labs Reviewed   CBC W/ AUTO DIFFERENTIAL - Abnormal; Notable for the following components:       Result Value    RBC 4.39 (*)     Hemoglobin 13.0 (*)     Hematocrit 39.7 (*)     All other components within normal limits   COMPREHENSIVE METABOLIC PANEL - Abnormal; Notable for the following components:    Albumin 3.3 (*)     ALT 9 (*)     All other components within normal limits   URINALYSIS, REFLEX TO URINE CULTURE - Abnormal; Notable for the following components:    Protein, UA Trace (*)     Occult Blood UA 2+ (*)     All other components within normal limits    Narrative:     Specimen Source->Urine   URINALYSIS MICROSCOPIC - Abnormal; Notable for the following components:    RBC, UA 85 (*)     WBC, UA 10 (*)     All other components within normal limits    Narrative:     Specimen Source->Urine   LIPASE   ISTAT PROCEDURE   ISTAT CHEM8          Imaging Results              SURG FL Surgery Retrograde Pyelogram (Final result)  Result time 11/27/22 18:48:54      Final result by Access, Silent  (11/27/22 18:48:54)                   Narrative:    See OP Notes for results.     IMPRESSION: See OP Notes for results.             This procedure was auto-finalized by: Virtual Radiologist                                      CT Abdomen Pelvis  Without Contrast (Final  result)  Result time 11/27/22 12:32:44      Final result by Maxim Rene MD (11/27/22 12:32:44)                   Impression:      1. Moderate to severe right-sided hydroureteronephrosis secondary to a 7-8 mm calculus within the distal right ureter.  Minimal left-sided hydronephrosis likely secondary to a 1.7 cm within the renal pelvis near the UPJ.  Bilateral nephrolithiasis.  2. Cholelithiasis without acute cholecystitis.  3. Diverticulosis coli.  4. Advanced systemic and coronary calcific atherosclerosis.  5. Bibasilar suspected chronic interstitial lung changes/pulmonary fibrosis without consolidative process.  There is also peribronchial thickening with bronchiectasis which can be seen with recent or remote/chronic small airways infectious or inflammatory process, with small component of pulmonary edema not excluded.  6. Bibasilar several small solid pulmonary nodules measuring up to 5 mm.  For multiple solid nodules all <6 mm, Fleischner Society 2017 guidelines recommend no routine follow up for a low risk patient, or follow up with non-contrast chest CT at 12 months after discovery in a high risk patient.  7. Additional findings as above.  This report was flagged in Epic as abnormal.  This report was flagged in Epic as containing an incidental finding.      Electronically signed by: Maxim Rene MD  Date:    11/27/2022  Time:    12:32               Narrative:    EXAMINATION:  CT ABDOMEN PELVIS WITHOUT CONTRAST    CLINICAL HISTORY:  RLQ abdominal pain (Age >= 14y);Right inguinal pain;    TECHNIQUE:  Low dose axial images, sagittal and coronal reformations were obtained from the lung bases to the pubic symphysis.  No contrast media was utilized.    COMPARISON:  None    FINDINGS:  Lack of IV contrast limits evaluation of soft tissue and vascular structures.    Imaged lung bases show mild dependent atelectasis, scattered areas of platelike scarring versus atelectasis, mild bronchiectasis, subpleural  reticulation bilaterally and a few small peripheral cysts some of which appear stacked within the right lower lobe suggesting honeycombing.  There are several scattered small solid pulmonary nodules with the largest measuring 5 mm within the posterolateral aspect of the lingula.  No large consolidation or pleural effusion.    Heart is mildly enlarged without significant pericardial fluid noting multi-vessel advanced coronary arterial calcific atherosclerosis and aortic annular and aortic valvular calcifications.    Small hiatal hernia.  There is a suspected small duodenal diverticulum at the C-loop.  Duodenum is otherwise within normal limits.    Right upper quadrant colonic interposition.  There is a solitary 1.4 cm dependent stone within the gallbladder without acute cholecystitis.  Pancreas is mildly atrophic without focal process or adjacent inflammatory change.  Liver is normal in size without focal process seen.  Spleen is somewhat small in size without focal process seen.  Bilateral adrenal glands are within normal limits.  No biliary ductal dilatation.    Bilateral kidneys are normal in location.  There is moderate to severe right-sided hydroureteronephrosis secondary to a 7-8 mm calculus within the distal right ureter at the level of the acetabulum.  There is suspected bilateral mild cortical thinning, allowing for lack of IV contrast.  The right kidney is slightly longer in length than the left presumably related to hydronephrosis.  Left kidney is normal in length.  There is bilateral mild cortical scarring.  Bilateral mild nonspecific perinephric stranding slightly more prominent on the right.  Few scattered punctate nephroliths at the right renal upper pole.  There is minimal left-sided hydronephrosis noting a 1.7 cm calculus within the left renal pelvis near the ureteropelvic junction (UPJ).  A few scattered small nephroliths throughout the left kidney with the largest measuring 2 mm at the upper pole.   No radiodense calculus seen within the left ureter or urinary bladder.  Left ureter is normal in course and caliber.    Urinary bladder is well distended without wall thickening.  Prostate is normal in size noting dystrophic calcifications in the central gland.  A few scattered pelvic phleboliths noted.    Appendix and terminal ileum are within normal limits.  Moderate amount of scattered fecal material throughout the colon.  Several scattered colonic diverticula without acute diverticulitis.  No bowel obstruction or acute inflammation.  No pneumatosis or portal venous gas.    No ascites, free air or lymphadenopathy.    Advanced scattered calcific atherosclerosis of the aorta extending into its mesenteric and iliac branches.  The aorta is mildly tortuous but nonaneurysmal.    Small fat containing umbilical hernia and small fat containing right inguinal hernia.  Small focus of subcutaneous stranding with a somewhat bandlike configuration within the right gluteal subcutaneous fat likely sequela of remote trauma or surgery.  No fluid collection or radiodense retained foreign body within the surrounding extraperitoneal soft tissues.    Osseous structures show generalized osteopenia, levocurvature of the lumbar spine, moderate to advanced degenerative changes of the imaged thoracic and lumbar spine, degenerative related grade 1 anterolisthesis of 4th on 5th of total 6 lumbar type vertebral bodies, and multilevel chronic appearing minimal to mild anterior wedge deformity of several mid to lower thoracic and also upper lumbar vertebral bodies.  No acute or destructive osseous process seen.                                    Medical decision making:  Given the above this patient presents to the emergency with right inguinal pain.  He is discovered to have a 8 mm calculus in the distal right ureter.  He also has a calculus in the proximal left ureter.  Urology was consulted.  This patient may require surgery today.   The  patient was taken to the operating room for ureteral stents.     Medications   piperacillin-tazobactam 4.5 g in dextrose 5 % 100 mL IVPB (ready to mix system) (4.5 g Intravenous Not Given 11/27/22 1445)   0.9%  NaCl infusion ( Intravenous New Bag 11/27/22 1822)   ondansetron injection 4 mg (has no administration in time range)   hydrALAZINE injection 10 mg (has no administration in time range)   oxyCODONE-acetaminophen 7.5-325 mg per tablet 1 tablet (has no administration in time range)   apixaban tablet 2.5 mg (has no administration in time range)   atorvastatin tablet 20 mg (20 mg Oral Not Given 11/27/22 1738)   aspirin EC tablet 81 mg (81 mg Oral Not Given 11/27/22 1738)   tamsulosin 24 hr capsule 0.4 mg (0.4 mg Oral Not Given 11/27/22 1745)   mupirocin 2 % ointment (has no administration in time range)   acetaminophen 1,000 mg/100 mL (10 mg/mL) injection 1,000 mg (1,000 mg Intravenous New Bag 11/27/22 1615)   ketorolac injection 15 mg (15 mg Intravenous Given 11/27/22 1614)   methocarbamoL tablet 500 mg (500 mg Oral Given 11/27/22 1615)                              Clinical Impression:   Final diagnoses:  [N20.1] Bilateral ureteral calculi (Primary)  [R10.31] Right inguinal pain      ED Disposition Condition    Admit                 Pedro Dee MD  11/27/22 1911

## 2022-11-27 NOTE — PROGRESS NOTES
Updated family, lydia rodriguez  Needs definitive management of L renal stone at later date- patient lives in [facility].  Discussed stent cannot stay longer than 3 months otherwise they will become encrusted

## 2022-11-27 NOTE — SUBJECTIVE & OBJECTIVE
Past Medical History:   Diagnosis Date    A-fib     CVA (cerebral vascular accident)     Kidney stone        History reviewed. No pertinent surgical history.    Review of patient's allergies indicates:  No Known Allergies    Family History    None         Tobacco Use    Smoking status: Never    Smokeless tobacco: Never   Substance and Sexual Activity    Alcohol use: Not Currently    Drug use: Never    Sexual activity: Not on file       Review of Systems   Constitutional:  Negative for activity change, appetite change, chills, diaphoresis and fever.   HENT:  Negative for congestion and rhinorrhea.    Eyes:  Negative for visual disturbance.   Respiratory:  Negative for choking and shortness of breath.    Gastrointestinal:  Positive for abdominal pain. Negative for abdominal distention, constipation, diarrhea, nausea and vomiting.   Genitourinary:  Negative for difficulty urinating, dysuria, flank pain, hematuria, scrotal swelling, testicular pain and urgency.   Skin:  Negative for color change, pallor and wound.   Neurological:  Positive for weakness. Negative for dizziness and syncope.   Psychiatric/Behavioral:  Negative for confusion and hallucinations.      Objective:     Temp:  [97.5 °F (36.4 °C)] 97.5 °F (36.4 °C)  Pulse:  [65-87] 68  Resp:  [18] 18  SpO2:  [95 %-99 %] 96 %  BP: (130-149)/(61-80) 145/67     Body mass index is 22.92 kg/m².           Drains       None                   Physical Exam  Constitutional:       General: He is not in acute distress.     Appearance: He is well-developed. He is not ill-appearing, toxic-appearing or diaphoretic.   HENT:      Head: Normocephalic and atraumatic.      Mouth/Throat:      Mouth: Mucous membranes are moist.   Eyes:      Conjunctiva/sclera: Conjunctivae normal.   Cardiovascular:      Rate and Rhythm: Normal rate.   Pulmonary:      Effort: Pulmonary effort is normal. No respiratory distress.   Abdominal:      General: There is no distension.      Palpations: Abdomen  is soft. There is no mass.      Tenderness: There is no abdominal tenderness. There is no right CVA tenderness, left CVA tenderness or guarding.   Musculoskeletal:         General: No swelling or deformity.      Cervical back: Neck supple.   Skin:     General: Skin is warm.      Capillary Refill: Capillary refill takes less than 2 seconds.      Findings: No rash.   Neurological:      Mental Status: He is alert and oriented to person, place, and time.   Psychiatric:         Mood and Affect: Mood normal.         Thought Content: Thought content normal.         Judgment: Judgment normal.       Significant Labs:    BMP:  Recent Labs   Lab 11/27/22  1109      K 4.3      CO2 25   BUN 23   CREATININE 0.9   CALCIUM 10.0       CBC:  Recent Labs   Lab 11/27/22  1109 11/27/22  1114   WBC 5.63  --    HGB 13.0*  --    HCT 39.7* 39     --        Significant Imaging:  Narrative & Impression  EXAMINATION:  CT ABDOMEN PELVIS WITHOUT CONTRAST     CLINICAL HISTORY:  RLQ abdominal pain (Age >= 14y);Right inguinal pain;     TECHNIQUE:  Low dose axial images, sagittal and coronal reformations were obtained from the lung bases to the pubic symphysis.  No contrast media was utilized.     COMPARISON:  None     FINDINGS:  Lack of IV contrast limits evaluation of soft tissue and vascular structures.     Imaged lung bases show mild dependent atelectasis, scattered areas of platelike scarring versus atelectasis, mild bronchiectasis, subpleural reticulation bilaterally and a few small peripheral cysts some of which appear stacked within the right lower lobe suggesting honeycombing.  There are several scattered small solid pulmonary nodules with the largest measuring 5 mm within the posterolateral aspect of the lingula.  No large consolidation or pleural effusion.     Heart is mildly enlarged without significant pericardial fluid noting multi-vessel advanced coronary arterial calcific atherosclerosis and aortic annular and  aortic valvular calcifications.     Small hiatal hernia.  There is a suspected small duodenal diverticulum at the C-loop.  Duodenum is otherwise within normal limits.     Right upper quadrant colonic interposition.  There is a solitary 1.4 cm dependent stone within the gallbladder without acute cholecystitis.  Pancreas is mildly atrophic without focal process or adjacent inflammatory change.  Liver is normal in size without focal process seen.  Spleen is somewhat small in size without focal process seen.  Bilateral adrenal glands are within normal limits.  No biliary ductal dilatation.     Bilateral kidneys are normal in location.  There is moderate to severe right-sided hydroureteronephrosis secondary to a 7-8 mm calculus within the distal right ureter at the level of the acetabulum.  There is suspected bilateral mild cortical thinning, allowing for lack of IV contrast.  The right kidney is slightly longer in length than the left presumably related to hydronephrosis.  Left kidney is normal in length.  There is bilateral mild cortical scarring.  Bilateral mild nonspecific perinephric stranding slightly more prominent on the right.  Few scattered punctate nephroliths at the right renal upper pole.  There is minimal left-sided hydronephrosis noting a 1.7 cm calculus within the left renal pelvis near the ureteropelvic junction (UPJ).  A few scattered small nephroliths throughout the left kidney with the largest measuring 2 mm at the upper pole.  No radiodense calculus seen within the left ureter or urinary bladder.  Left ureter is normal in course and caliber.     Urinary bladder is well distended without wall thickening.  Prostate is normal in size noting dystrophic calcifications in the central gland.  A few scattered pelvic phleboliths noted.     Appendix and terminal ileum are within normal limits.  Moderate amount of scattered fecal material throughout the colon.  Several scattered colonic diverticula without  acute diverticulitis.  No bowel obstruction or acute inflammation.  No pneumatosis or portal venous gas.     No ascites, free air or lymphadenopathy.     Advanced scattered calcific atherosclerosis of the aorta extending into its mesenteric and iliac branches.  The aorta is mildly tortuous but nonaneurysmal.     Small fat containing umbilical hernia and small fat containing right inguinal hernia.  Small focus of subcutaneous stranding with a somewhat bandlike configuration within the right gluteal subcutaneous fat likely sequela of remote trauma or surgery.  No fluid collection or radiodense retained foreign body within the surrounding extraperitoneal soft tissues.     Osseous structures show generalized osteopenia, levocurvature of the lumbar spine, moderate to advanced degenerative changes of the imaged thoracic and lumbar spine, degenerative related grade 1 anterolisthesis of 4th on 5th of total 6 lumbar type vertebral bodies, and multilevel chronic appearing minimal to mild anterior wedge deformity of several mid to lower thoracic and also upper lumbar vertebral bodies.  No acute or destructive osseous process seen.     Impression:     1. Moderate to severe right-sided hydroureteronephrosis secondary to a 7-8 mm calculus within the distal right ureter.  Minimal left-sided hydronephrosis likely secondary to a 1.7 cm within the renal pelvis near the UPJ.  Bilateral nephrolithiasis.  2. Cholelithiasis without acute cholecystitis.  3. Diverticulosis coli.  4. Advanced systemic and coronary calcific atherosclerosis.  5. Bibasilar suspected chronic interstitial lung changes/pulmonary fibrosis without consolidative process.  There is also peribronchial thickening with bronchiectasis which can be seen with recent or remote/chronic small airways infectious or inflammatory process, with small component of pulmonary edema not excluded.  6. Bibasilar several small solid pulmonary nodules measuring up to 5 mm.  For multiple  solid nodules all <6 mm, Fleischner Society 2017 guidelines recommend no routine follow up for a low risk patient, or follow up with non-contrast chest CT at 12 months after discovery in a high risk patient.  7. Additional findings as above.  This report was flagged in Epic as abnormal.  This report was flagged in Epic as containing an incidental finding.        Electronically signed by: Maxim Rene MD  Date:                                            11/27/2022  Time:                                           12:32

## 2022-11-27 NOTE — ASSESSMENT & PLAN NOTE
Patient with Persistent (7 days or more) atrial fibrillation which is controlled currently with no meds. Patient is currently in atrial fibrillation.[id number] Score: 2. HASBLED Score: 3  Anticoagulation indicated. Anticoagulation done with Eliquis .

## 2022-11-27 NOTE — ANESTHESIA PROCEDURE NOTES
Intubation    Date/Time: 11/27/2022 2:35 PM  Performed by: Damon Cardona CRNA  Authorized by: Nickie Carroll MD     Intubation:     Induction:  Intravenous    Intubated:  Postinduction    Mask Ventilation:  Not attempted    Attempts:  1    Attempted By:  CRNA    Method of Intubation:  Video laryngoscopy    Blade:  Sloan 3    Laryngeal View Grade: Grade I - full view of cords      Difficult Airway Encountered?: No      Complications:  None    Airway Device:  Oral endotracheal tube    Airway Device Size:  7.5    Inflation Amount (mL):  5    Tube secured:  22    Secured at:  The lips    Placement Verified By:  Capnometry    Complicating Factors:  None    Findings Post-Intubation:  BS equal bilateral and atraumatic/condition of teeth unchanged

## 2022-11-27 NOTE — OR NURSING
"Top dentures returned to patient who put them in his mouth.  States he left bottom dentures "at home".  "

## 2022-11-27 NOTE — HPI
89 yo man presents to ED with R hip pain. Found to have obstructing R distal ureteral stone for which urology was consulted. He has preserved renal function, no evidence of infection or sepsis. UA without infection. Patient also found to have Left non obstructing stone.   Hx of CVA w/ R sided deficits, afib on aspirin/apixiban  Last meal at 8am approximately

## 2022-11-28 ENCOUNTER — TELEPHONE (OUTPATIENT)
Dept: UROLOGY | Facility: CLINIC | Age: 87
End: 2022-11-28
Payer: COMMERCIAL

## 2022-11-28 VITALS
DIASTOLIC BLOOD PRESSURE: 66 MMHG | TEMPERATURE: 98 F | RESPIRATION RATE: 19 BRPM | HEART RATE: 71 BPM | WEIGHT: 142 LBS | SYSTOLIC BLOOD PRESSURE: 127 MMHG | OXYGEN SATURATION: 95 % | HEIGHT: 66 IN | BODY MASS INDEX: 22.82 KG/M2

## 2022-11-28 PROBLEM — N20.0 RENAL CALCULUS: Status: RESOLVED | Noted: 2022-11-27 | Resolved: 2022-11-28

## 2022-11-28 LAB
FINAL PATHOLOGIC DIAGNOSIS: NORMAL
GROSS: NORMAL
Lab: NORMAL

## 2022-11-28 PROCEDURE — 63600175 PHARM REV CODE 636 W HCPCS: Performed by: INTERNAL MEDICINE

## 2022-11-28 PROCEDURE — 25000003 PHARM REV CODE 250: Performed by: INTERNAL MEDICINE

## 2022-11-28 PROCEDURE — 99232 PR SUBSEQUENT HOSPITAL CARE,LEVL II: ICD-10-PCS | Mod: ,,, | Performed by: UROLOGY

## 2022-11-28 PROCEDURE — G0378 HOSPITAL OBSERVATION PER HR: HCPCS

## 2022-11-28 PROCEDURE — 99232 SBSQ HOSP IP/OBS MODERATE 35: CPT | Mod: ,,, | Performed by: UROLOGY

## 2022-11-28 RX ADMIN — ATORVASTATIN CALCIUM 20 MG: 10 TABLET, FILM COATED ORAL at 09:11

## 2022-11-28 RX ADMIN — TAMSULOSIN HYDROCHLORIDE 0.4 MG: 0.4 CAPSULE ORAL at 09:11

## 2022-11-28 RX ADMIN — APIXABAN 2.5 MG: 2.5 TABLET, FILM COATED ORAL at 09:11

## 2022-11-28 RX ADMIN — MUPIROCIN: 20 OINTMENT TOPICAL at 09:11

## 2022-11-28 RX ADMIN — PIPERACILLIN AND TAZOBACTAM 4.5 G: 4; .5 INJECTION, POWDER, LYOPHILIZED, FOR SOLUTION INTRAVENOUS; PARENTERAL at 06:11

## 2022-11-28 RX ADMIN — ASPIRIN 81 MG: 81 TABLET, DELAYED RELEASE ORAL at 09:11

## 2022-11-28 NOTE — PLAN OF CARE
[facility] - Telemetry  Discharge Final Note    Primary Care Provider: [facility].    Expected Discharge Date: 11/28/2022    All CM needs met. CM notified nurse, Jaron that pt is ready for discharge from CM standpoint.    Final Discharge Note (most recent)       Final Note - 11/28/22 1110          Final Note    Assessment Type Final Discharge Note (P)      Anticipated Discharge Disposition Home or Self Care (P)      What phone number can be called within the next 1-3 days to see how you are doing after discharge? [phone] (P)      Hospital Resources/Appts/Education Provided Appointments scheduled and added to AVS;Appointments scheduled by Navigator/Coordinator (P)         Post-Acute Status    Coverage [employer] PPO (P)      Discharge Delays None known at this time (P)                      Important Message from Medicare             Contact Info       W Jayme Hoffman MD   Specialty: Urology    [address]  [address]  [facility] [address]   Phone: [phone]       Next Steps: Follow up in 1 week(s)    Instructions: Office will call patient. No available appts until January 17, 2022.    [facility]   Relationship: PCP - General    [address] [facility] [address]   Phone: [phone]       Next Steps: Call    Instructions: To schedule appointment within 7 days.

## 2022-11-28 NOTE — ANESTHESIA POSTPROCEDURE EVALUATION
Anesthesia Post Evaluation    Patient: John N Jaxson    Procedure(s) Performed: Procedure(s) (LRB):  CYSTOSCOPY, WITH RETROGRADE PYELOGRAM AND URETERAL STENT INSERTION (Bilateral)  REMOVAL, CALCULUS, URETER, URETEROSCOPIC (Right)    Final Anesthesia Type: general      Patient location during evaluation: PACU  Patient participation: Yes- Able to Participate  Level of consciousness: awake and alert, oriented and awake  Post-procedure vital signs: reviewed and stable  Airway patency: patent    PONV status at discharge: No PONV  Anesthetic complications: no      Cardiovascular status: blood pressure returned to baseline  Respiratory status: unassisted, spontaneous ventilation and room air  Hydration status: euvolemic  Follow-up not needed.          Vitals Value Taken Time   /57 11/28/22 0456   Temp 36.7 °C (98 °F) 11/28/22 0456   Pulse 68 11/28/22 0456   Resp 18 11/28/22 0456   SpO2 98 % 11/28/22 0456         Event Time   Out of Recovery 17:08:29         Pain/Kamron Score: Pain Rating Prior to Med Admin: 0 (11/27/2022  4:15 PM)  Pain Rating Post Med Admin: 3 (11/27/2022  4:46 PM)  Kamron Score: 10 (11/27/2022  4:46 PM)

## 2022-11-28 NOTE — PLAN OF CARE
Problem: Adult Inpatient Plan of Care  Goal: Plan of Care Review  Outcome: Met  Goal: Patient-Specific Goal (Individualized)  Outcome: Met  Goal: Absence of Hospital-Acquired Illness or Injury  Outcome: Met  Goal: Optimal Comfort and Wellbeing  Outcome: Met  Goal: Readiness for Transition of Care  Outcome: Met     Problem: Infection  Goal: Absence of Infection Signs and Symptoms  Outcome: Met     Problem: Fall Injury Risk  Goal: Absence of Fall and Fall-Related Injury  Outcome: Met     Problem: Impaired Wound Healing  Goal: Optimal Wound Healing  Outcome: Met     Problem: Skin Injury Risk Increased  Goal: Skin Health and Integrity  Outcome: Met   Dc instructions and AVS reviewed with pt and son. All questions answered. Pt has voided red urine post gill removal. Per MD order, ok to dc once pt voids. NADN or reported.

## 2022-11-28 NOTE — PROGRESS NOTES
[facility] Medicine  Progress Note    Patient Name: John Puri  MRN: [id number]  Patient Class: IP- Inpatient   Admission Date: 11/27/2022  Length of Stay: 1 days  Attending Physician: Lb Godinez MD  Primary Care Provider: No primary care provider on file.        Subjective:     Principal Problem:Renal calculus        HPI:  Patient presents with R groin pain for 4 days. Known history of renal calculi in the past.  CT finds bilateral calculi with hydronephrosis.  Urology has been consulted for intervention today.  No other complaints       Overview/Hospital Course:  Patient presented with bilateral kidney stones.  R ureteroscopy with stone removal. Bilateral stents placed. Patient will have to have L stone removed as out patient. Urology noted ok to discharge. Activity as tolerated. Diet- low NA. Follow up with urology in one week.       Interval History: No new issues     Review of Systems   Constitutional:  Negative for activity change, appetite change, chills, diaphoresis and fever.   HENT:  Negative for congestion.    Eyes:  Negative for discharge and itching.   Respiratory:  Negative for apnea and chest tightness.    Cardiovascular:  Negative for chest pain and leg swelling.   Gastrointestinal:  Negative for abdominal distention and anal bleeding.   Endocrine: Negative for cold intolerance.   Genitourinary:  Negative for difficulty urinating and dysuria.   Musculoskeletal:  Negative for arthralgias and back pain.   Neurological:  Negative for dizziness and facial asymmetry.   Psychiatric/Behavioral:  Negative for agitation and behavioral problems.    Objective:     Vital Signs (Most Recent):  Temp: 98.2 °F (36.8 °C) (11/28/22 0734)  Pulse: 70 (11/28/22 0734)  Resp: 19 (11/28/22 0734)  BP: 135/61 (11/28/22 0734)  SpO2: 97 % (11/28/22 0734) Vital Signs (24h Range):  Temp:  [96.8 °F (36 °C)-98.2 °F (36.8 °C)] 98.2 °F (36.8 °C)  Pulse:  [] 70  Resp:  [13-24] 19  SpO2:  [95  %-100 %] 97 %  BP: (112-160)/(57-80) 135/61     Weight: 64.4 kg (141 lb 15.6 oz)  Body mass index is 22.92 kg/m².    Intake/Output Summary (Last 24 hours) at 11/28/2022 0946  Last data filed at 11/28/2022 0915  Gross per 24 hour   Intake 1020 ml   Output 1900 ml   Net -880 ml      Physical Exam  Vitals and nursing note reviewed.   Constitutional:       General: He is not in acute distress.     Appearance: Normal appearance. He is not ill-appearing or toxic-appearing.   HENT:      Head: Normocephalic and atraumatic.      Mouth/Throat:      Pharynx: Oropharynx is clear.   Eyes:      Conjunctiva/sclera: Conjunctivae normal.   Cardiovascular:      Rate and Rhythm: Regular rhythm.   Pulmonary:      Effort: Pulmonary effort is normal. No respiratory distress.   Skin:     General: Skin is dry.   Neurological:      Mental Status: He is alert and oriented to person, place, and time.   Psychiatric:         Behavior: Behavior normal.       Significant Labs: All pertinent labs within the past 24 hours have been reviewed.  BMP:   Recent Labs   Lab 11/27/22  1109         K 4.3      CO2 25   BUN 23   CREATININE 0.9   CALCIUM 10.0     CBC:   Recent Labs   Lab 11/27/22  1109 11/27/22  1114   WBC 5.63  --    HGB 13.0*  --    HCT 39.7* 39     --        Significant Imaging:       Assessment/Plan:      * Renal calculus  Bilateral. L > R with hydro.  Will have stent placement today. Home tomorrow. L stent    Bilateral stents placed.  Ureteroscopy on R with stone removal.  L stone will have to be removed out patient. Will DC to home per urology with follow up.       Chronic atrial fibrillation  Patient with Persistent (7 days or more) atrial fibrillation which is controlled currently with no meds. Patient is currently in atrial fibrillation.[id number] Score: 2. HASBLED Score: 3  Anticoagulation indicated. Anticoagulation done with Eliquis .        History of stroke  No acute issues.         VTE Risk Mitigation  (From admission, onward)         Ordered     apixaban tablet 2.5 mg  2 times daily         11/27/22 1353                Discharge Planning   HUMAIRA: 11/28/2022     Code Status: Not on file   Is the patient medically ready for discharge?:     Reason for patient still in hospital (select all that apply): Patient unstable                     Lb Plascencia MD  Department of [facility] Medicine   [facility]

## 2022-11-28 NOTE — CODE 44
"MEDICARE CONDITION 44    (Reference: Transmittal 200 of the Medicare Manual)    A Medicare "Inpatient Admission" may be changed to an "Outpatient" (includes  Outpatient Observation) status, if the following conditions exist:  The change in patient status from inpatient to outpatient is made prior to        discharge or release, while the patient is still a patient in the hospital.   The hospital has not submitted a claim for the inpatient admission.  A physician concurs with the Utilization Committee decision.   Physician concurrence with the Utilization Committee's decision is documented         in the patient's records.         IMPLEMENTATION OF CONDITION CODE 44    Inpatient status has been reviewed prior to discharge. Change to Outpatient Observation status, in accordance with Condition Code 44.     By entering this in the medical record, I verify that I have reviewed and concur with the findings of the Utilization Review Committee and the Utilization Review Physician, and that the identified Patient does not meet medical necessity for Inpatient status. This patient is appropriate for the downgrade in status because Upon subsequent review, it was determined that the patient did not meet the medical necessity for Inpatient care . Outpatient Observation is the identified level of care appropriate for the Patient, and all of the above conditions for this status change have been met.   "

## 2022-11-28 NOTE — SUBJECTIVE & OBJECTIVE
Interval History: s/p right ureteroscopy and bilateral stent placement 11/27/2022 with Dr. Heredia.  Pain controlled.    Review of Systems   Constitutional: Negative.  Negative for fever.   HENT: Negative.     Eyes: Negative.    Respiratory:  Negative for cough, chest tightness and shortness of breath.    Cardiovascular:  Negative for chest pain.   Gastrointestinal: Negative.  Negative for constipation, diarrhea and nausea.   Genitourinary:  Positive for hematuria. Negative for flank pain.   Musculoskeletal: Negative.    Neurological: Negative.    Psychiatric/Behavioral: Negative.     Objective:     Temp:  [96.8 °F (36 °C)-98.2 °F (36.8 °C)] 98.2 °F (36.8 °C)  Pulse:  [] 70  Resp:  [13-24] 19  SpO2:  [95 %-100 %] 97 %  BP: (112-160)/(57-80) 135/61     Body mass index is 22.92 kg/m².    Date 11/28/22 0700 - 11/29/22 0659   Shift 5685-5817 6200-5757 4842-0624 24 Hour Total   INTAKE   P.O. 120   120   Shift Total(mL/kg) 120(1.9)   120(1.9)   OUTPUT   Shift Total(mL/kg)       Weight (kg) 64.4 64.4 64.4 64.4          Drains       Drain  Duration                  Ureteral Drain/Stent 11/27/22 1530 Left ureter  <1 day         Ureteral Drain/Stent 11/27/22 1531 Right ureter  <1 day         Urethral Catheter 11/27/22 1554 16 Fr. <1 day                    Physical Exam  Vitals and nursing note reviewed.   Constitutional:       Appearance: He is well-developed.   HENT:      Head: Normocephalic.   Eyes:      Conjunctiva/sclera: Conjunctivae normal.   Neck:      Thyroid: No thyromegaly.      Trachea: No tracheal deviation.   Cardiovascular:      Rate and Rhythm: Normal rate.      Heart sounds: Normal heart sounds.   Pulmonary:      Effort: Pulmonary effort is normal. No respiratory distress.      Breath sounds: Normal breath sounds. No wheezing.   Abdominal:      General: Bowel sounds are normal.      Palpations: Abdomen is soft.      Tenderness: There is no abdominal tenderness. There is no rebound.      Hernia: No  hernia is present.   Genitourinary:     Comments: 16 Fr gill in place with thin dark red urine, no clots  Musculoskeletal:         General: No tenderness. Normal range of motion.      Cervical back: Normal range of motion and neck supple.   Lymphadenopathy:      Cervical: No cervical adenopathy.   Skin:     General: Skin is warm and dry.      Findings: No erythema or rash.   Neurological:      Mental Status: He is alert and oriented to person, place, and time.   Psychiatric:         Behavior: Behavior normal.         Thought Content: Thought content normal.         Judgment: Judgment normal.       Significant Labs:    BMP:  Recent Labs   Lab 11/27/22  1109      K 4.3      CO2 25   BUN 23   CREATININE 0.9   CALCIUM 10.0       CBC:   Recent Labs   Lab 11/27/22  1109 11/27/22  1114   WBC 5.63  --    HGB 13.0*  --    HCT 39.7* 39     --        Blood Culture: No results for input(s): LABBLOO in the last 168 hours.  Urine Culture:   Recent Labs   Lab 11/27/22  1542   LABURIN No growth to date       Significant Imaging:

## 2022-11-28 NOTE — HOSPITAL COURSE
Patient presented with bilateral kidney stones.  R ureteroscopy with stone removal. Bilateral stents placed. Patient will have to have L stone removed as out patient. Urology noted ok to discharge. Activity as tolerated. Diet- low NA. Follow up with urology in one week.

## 2022-11-28 NOTE — ASSESSMENT & PLAN NOTE
Bilateral. L > R with hydro.  Will have stent placement today. Home tomorrow. L stent    Bilateral stents placed.  Ureteroscopy on R with stone removal.  L stone will have to be removed out patient. Will DC to home per urology with follow up.

## 2022-11-28 NOTE — TELEPHONE ENCOUNTER
Spoke with pt's son's wife, she stated that she will have the person he normally lives with to call the clinic to discuss scheduling hospital follow up    ----- Message from Rosemary Cabello sent at 11/28/2022 11:05 AM CST -----  Regarding: [phone]  Type:  Sooner Appointment Request    Patient is requesting a sooner appointment.  Patient declined first available appointment listed as well as another facility and provider .  Patient will not accept being placed on the waitlist and is requesting a message be sent to doctor.    Name of Caller:     When is the first available appointment? 1/17    Symptoms: hosp f/u    Would the patient rather a call back or a response via [facility]? Call back     Best Call Back Number: [phone] Edmundo- pt son    Additional Information: pt needs a follow up appt.

## 2022-11-28 NOTE — DISCHARGE SUMMARY
[facility] Medicine  Discharge Summary      Patient Name: John Puri  MRN: [id number]  GM: [id number]  Patient Class: IP- Inpatient  Admission Date: 11/27/2022  Hospital Length of Stay: 1 days  Discharge Date and Time:  11/28/2022 9:52 AM  Attending Physician: Lb Godinez MD   Discharging Provider: Lb Godinez MD  Primary Care Provider: No primary care provider on file.    Primary Care Team: Networked reference to record PCT     HPI:   Patient presents with R groin pain for 4 days. Known history of renal calculi in the past.  CT finds bilateral calculi with hydronephrosis.  Urology has been consulted for intervention today.  No other complaints       Procedure(s) (LRB):  CYSTOSCOPY, WITH RETROGRADE PYELOGRAM AND URETERAL STENT INSERTION (Bilateral)  REMOVAL, CALCULUS, URETER, URETEROSCOPIC (Right)      Hospital Course:   Patient presented with bilateral kidney stones.  R ureteroscopy with stone removal. Bilateral stents placed. Patient will have to have L stone removed as out patient. Urology noted ok to discharge. Activity as tolerated. Diet- low NA. Follow up with urology in one week.        Goals of Care Treatment Preferences:         Consults:   Consults (From admission, onward)        Status Ordering Provider     Inpatient consult to Urology  Once        Provider:  Noelle Heredia MD    Completed MEAGAN DAVIS          No new Assessment & Plan notes have been filed under this hospital service since the last note was generated.  Service: Hospital Medicine    Final Active Diagnoses:    Diagnosis Date Noted POA    History of stroke [Z86.73] 11/27/2022 Not Applicable    Chronic atrial fibrillation [I48.20] 11/27/2022 Yes      Problems Resolved During this Admission:    Diagnosis Date Noted Date Resolved POA    PRINCIPAL PROBLEM:  Renal calculus [N20.0] 11/27/2022 11/28/2022 Yes       Discharged Condition: good    Disposition:  Home     Follow Up:   Follow-up  Information     W Jayme Hoffman MD Follow up in 1 week(s).    Specialty: Urology  Contact information:  [address]  [address]  [place] [address] [phone]                       Patient Instructions:   No discharge procedures on file.    Significant Diagnostic Studies:     Pending Diagnostic Studies:     Procedure Component Value Units Date/Time    Specimen to Pathology, Surgery Urology [[id number]] Collected: 11/27/22 1541    Order Status: Sent Lab Status: In process Updated: 11/27/22 1618    Specimen: Tissue          Medications:  Reconciled Home Medications:      Medication List      You have not been prescribed any medications.         Indwelling Lines/Drains at time of discharge:   Lines/Drains/Airways     Drain  Duration                Ureteral Drain/Stent 11/27/22 1530 Left ureter  <1 day         Ureteral Drain/Stent 11/27/22 1531 Right ureter  <1 day                Time spent on the discharge of patient:  > 35  minutes         Lb Plascencia MD  Department of Hospital Medicine  [facility] [place] [facility]

## 2022-11-28 NOTE — SUBJECTIVE & OBJECTIVE
Interval History: No new issues     Review of Systems   Constitutional:  Negative for activity change, appetite change, chills, diaphoresis and fever.   HENT:  Negative for congestion.    Eyes:  Negative for discharge and itching.   Respiratory:  Negative for apnea and chest tightness.    Cardiovascular:  Negative for chest pain and leg swelling.   Gastrointestinal:  Negative for abdominal distention and anal bleeding.   Endocrine: Negative for cold intolerance.   Genitourinary:  Negative for difficulty urinating and dysuria.   Musculoskeletal:  Negative for arthralgias and back pain.   Neurological:  Negative for dizziness and facial asymmetry.   Psychiatric/Behavioral:  Negative for agitation and behavioral problems.    Objective:     Vital Signs (Most Recent):  Temp: 98.2 °F (36.8 °C) (11/28/22 0734)  Pulse: 70 (11/28/22 0734)  Resp: 19 (11/28/22 0734)  BP: 135/61 (11/28/22 0734)  SpO2: 97 % (11/28/22 0734) Vital Signs (24h Range):  Temp:  [96.8 °F (36 °C)-98.2 °F (36.8 °C)] 98.2 °F (36.8 °C)  Pulse:  [] 70  Resp:  [13-24] 19  SpO2:  [95 %-100 %] 97 %  BP: (112-160)/(57-80) 135/61     Weight: 64.4 kg (141 lb 15.6 oz)  Body mass index is 22.92 kg/m².    Intake/Output Summary (Last 24 hours) at 11/28/2022 0946  Last data filed at 11/28/2022 0915  Gross per 24 hour   Intake 1020 ml   Output 1900 ml   Net -880 ml      Physical Exam  Vitals and nursing note reviewed.   Constitutional:       General: He is not in acute distress.     Appearance: Normal appearance. He is not ill-appearing or toxic-appearing.   HENT:      Head: Normocephalic and atraumatic.      Mouth/Throat:      Pharynx: Oropharynx is clear.   Eyes:      Conjunctiva/sclera: Conjunctivae normal.   Cardiovascular:      Rate and Rhythm: Regular rhythm.   Pulmonary:      Effort: Pulmonary effort is normal. No respiratory distress.   Skin:     General: Skin is dry.   Neurological:      Mental Status: He is alert and oriented to person, place, and time.    Psychiatric:         Behavior: Behavior normal.       Significant Labs: All pertinent labs within the past 24 hours have been reviewed.  BMP:   Recent Labs   Lab 11/27/22  1109         K 4.3      CO2 25   BUN 23   CREATININE 0.9   CALCIUM 10.0     CBC:   Recent Labs   Lab 11/27/22  1109 11/27/22  1114   WBC 5.63  --    HGB 13.0*  --    HCT 39.7* 39     --        Significant Imaging:

## 2022-11-28 NOTE — PROGRESS NOTES
[facility]  [facility]rology  Progress Note    Patient Name: John Puri  MRN: [id number]  Admission Date: 11/27/2022  Hospital Length of Stay: 1 days  Code Status: No Order   Attending Provider: Lb Godinez MD   Primary Care Physician: No primary care provider on file.    Subjective:     HPI:  89 yo man presents to ED with R hip pain. Found to have obstructing R distal ureteral stone for which urology was consulted. He has preserved renal function, no evidence of infection or sepsis. UA without infection. Patient also found to have Left non obstructing stone.   Hx of CVA w/ R sided deficits, afib on aspirin/apixiban  Last meal at 8am approximately      Interval History: s/p right ureteroscopy and bilateral stent placement 11/27/2022 with Dr. Heredia.  Pain controlled.    Review of Systems   Constitutional: Negative.  Negative for fever.   HENT: Negative.     Eyes: Negative.    Respiratory:  Negative for cough, chest tightness and shortness of breath.    Cardiovascular:  Negative for chest pain.   Gastrointestinal: Negative.  Negative for constipation, diarrhea and nausea.   Genitourinary:  Positive for hematuria. Negative for flank pain.   Musculoskeletal: Negative.    Neurological: Negative.    Psychiatric/Behavioral: Negative.     Objective:     Temp:  [96.8 °F (36 °C)-98.2 °F (36.8 °C)] 98.2 °F (36.8 °C)  Pulse:  [] 70  Resp:  [13-24] 19  SpO2:  [95 %-100 %] 97 %  BP: (112-160)/(57-80) 135/61     Body mass index is 22.92 kg/m².    Date 11/28/22 0700 - 11/29/22 0659   Shift 9453-9101 3447-4255 1143-4431 24 Hour Total   INTAKE   P.O. 120   120   Shift Total(mL/kg) 120(1.9)   120(1.9)   OUTPUT   Shift Total(mL/kg)       Weight (kg) 64.4 64.4 64.4 64.4          Drains       Drain  Duration                  Ureteral Drain/Stent 11/27/22 1530 Left ureter  <1 day         Ureteral Drain/Stent 11/27/22 1531 Right ureter  <1 day         Urethral Catheter 11/27/22 1554 16 Fr. <1 day                     Physical Exam  Vitals and nursing note reviewed.   Constitutional:       Appearance: He is well-developed.   HENT:      Head: Normocephalic.   Eyes:      Conjunctiva/sclera: Conjunctivae normal.   Neck:      Thyroid: No thyromegaly.      Trachea: No tracheal deviation.   Cardiovascular:      Rate and Rhythm: Normal rate.      Heart sounds: Normal heart sounds.   Pulmonary:      Effort: Pulmonary effort is normal. No respiratory distress.      Breath sounds: Normal breath sounds. No wheezing.   Abdominal:      General: Bowel sounds are normal.      Palpations: Abdomen is soft.      Tenderness: There is no abdominal tenderness. There is no rebound.      Hernia: No hernia is present.   Genitourinary:     Comments: 16 Fr gill in place with thin dark red urine, no clots  Musculoskeletal:         General: No tenderness. Normal range of motion.      Cervical back: Normal range of motion and neck supple.   Lymphadenopathy:      Cervical: No cervical adenopathy.   Skin:     General: Skin is warm and dry.      Findings: No erythema or rash.   Neurological:      Mental Status: He is alert and oriented to person, place, and time.   Psychiatric:         Behavior: Behavior normal.         Thought Content: Thought content normal.         Judgment: Judgment normal.       Significant Labs:    BMP:  Recent Labs   Lab 11/27/22  1109      K 4.3      CO2 25   BUN 23   CREATININE 0.9   CALCIUM 10.0       CBC:   Recent Labs   Lab 11/27/22  1109 11/27/22  1114   WBC 5.63  --    HGB 13.0*  --    HCT 39.7* 39     --        Blood Culture: No results for input(s): LABBLOO in the last 168 hours.  Urine Culture:   Recent Labs   Lab 11/27/22  1542   LABURIN No growth to date       Significant Imaging:                      Assessment/Plan:     * Renal calculus  S/p right ureteroscopy and removal of stone  S/p bilateral stent  Left renal stone will need management as an outpatient  D/C Jey   Okay to d/c home after he  voids    Right ureteral stone  Non obstructing L renal stone  R obstructing ureteral stone with hydronephrosis down to the ureterovesical junction  Discussed treatment of R ureteral stone with stent placement bilaterally  Discussed left renal stone can be managed at a later date  Urine culture  Informed consent  Plan for cystoscopy bilateral retrograde pyelogram, right ureteroscopic stone removal, bilateral stent placement        VTE Risk Mitigation (From admission, onward)         Ordered     apixaban tablet 2.5 mg  2 times daily         11/27/22 1353                W Jayme Hoffman MD  Urology  [facility]

## 2022-11-28 NOTE — ASSESSMENT & PLAN NOTE
S/p right ureteroscopy and removal of stone  S/p bilateral stent  Left renal stone will need management as an outpatient  D/C Khanna   Okay to d/c home after he voids

## 2022-11-28 NOTE — PLAN OF CARE
Problem: Adult Inpatient Plan of Care  Goal: Plan of Care Review  Outcome: Ongoing, Progressing  Goal: Patient-Specific Goal (Individualized)  Outcome: Ongoing, Progressing  Goal: Absence of Hospital-Acquired Illness or Injury  Outcome: Ongoing, Progressing  Goal: Optimal Comfort and Wellbeing  Outcome: Ongoing, Progressing  Goal: Readiness for Transition of Care  Outcome: Ongoing, Progressing     Problem: Infection  Goal: Absence of Infection Signs and Symptoms  Outcome: Ongoing, Progressing     Problem: Fall Injury Risk  Goal: Absence of Fall and Fall-Related Injury  Outcome: Ongoing, Progressing     Problem: Impaired Wound Healing  Goal: Optimal Wound Healing  Outcome: Ongoing, Progressing     Explained plan of care, verbalized understanding. Educated pt .on new medicine . No injury during shift, Side rails up x 2, call light by bedside.

## 2022-11-28 NOTE — PATIENT INSTRUCTIONS
Your right sided kidney stones were treated during this admission  Your 1.7 cm renal stone on the left was not treated  Please follow up with a urologist ASAP in [place]  Delaying treatment can result in stone encrustation of the stent, infection, kidney injury and possible loss of kidney

## 2022-11-29 ENCOUNTER — TELEPHONE (OUTPATIENT)
Dept: UROLOGY | Facility: CLINIC | Age: 87
End: 2022-11-29
Payer: COMMERCIAL

## 2022-11-29 DIAGNOSIS — N20.1 RIGHT URETERAL STONE: Primary | ICD-10-CM

## 2022-11-29 LAB — BACTERIA UR CULT: NO GROWTH

## 2022-11-29 RX ORDER — HYDROCODONE BITARTRATE AND ACETAMINOPHEN 5; 325 MG/1; MG/1
1 TABLET ORAL EVERY 6 HOURS PRN
Qty: 28 TABLET | Refills: 0 | Status: SHIPPED | OUTPATIENT
Start: 2022-11-29

## 2022-11-29 NOTE — TELEPHONE ENCOUNTER
----- Message from Elle Gillespie MA sent at 11/29/2022 12:06 PM CST -----  The pt daughter is wanting to know if he can have pain medication due to his kidney stones. Please advise.

## 2022-12-01 ENCOUNTER — TELEPHONE (OUTPATIENT)
Dept: UROLOGY | Facility: CLINIC | Age: 87
End: 2022-12-01
Payer: COMMERCIAL

## 2022-12-01 LAB
COMPN STONE: NORMAL
COMPN STONE: NORMAL
SPECIMEN SOURCE: NORMAL
SPECIMEN SOURCE: NORMAL
STONE ANALYSIS IR-IMP: NORMAL
STONE ANALYSIS IR-IMP: NORMAL

## 2022-12-01 NOTE — TELEPHONE ENCOUNTER
Spoke with pt's daughter and informed her that pain and blood in the urine are common with stents. She stated that she got him an appointment with a urologist closer to where they live as well just to check it out since he is in a lot of pain still with the medication.    ----- Message from Zaki Khan sent at 12/1/2022  1:00 PM CST -----  Type: Patient Call Back    Who called:Daughter/Ramona    What is the request in detail: Pt had kidney stones removed. Pt is still experiencing pain and bleeding. Pt's daughter would like to know if this is normal. Please call    Can the clinic reply by [facility]? no    Would the patient rather a call back or a response via [facility]? Call back    Best call back number:[phone],  [phone] (home)       Additional Information:

## 2022-12-02 LAB
COMPN STONE: NORMAL
SPECIMEN SOURCE: NORMAL
STONE ANALYSIS IR-IMP: NORMAL

## (undated) DEVICE — CONTAINER SPECIMEN STRL 4OZ

## (undated) DEVICE — BLANKET UPPER BODY 78.7X29.9IN

## (undated) DEVICE — ELECTRODE REM PLYHSV RETURN 9

## (undated) DEVICE — FIBER MOSES 550 DFL

## (undated) DEVICE — CATH FOLEY 2-WAY 8F 3CC

## (undated) DEVICE — CANISTER SUCTION 2 LTR

## (undated) DEVICE — GLOVE SURGICAL LATEX SZ 6.5

## (undated) DEVICE — BAG PRESSURE INFUSER 3000CC

## (undated) DEVICE — SOL IRR NACL .9% 3000ML

## (undated) DEVICE — CATH URETERAL ANES 10FR 50CM

## (undated) DEVICE — COVER SNAP KAP 26IN

## (undated) DEVICE — MAT QUICK 40X30 FLOOR FLUID LF

## (undated) DEVICE — SUPPORT ULNA NERVE PROTECTOR

## (undated) DEVICE — SENSOR DUAL FLEX STR 150CM

## (undated) DEVICE — GLOVE BIOGEL PI MICRO SZ 7

## (undated) DEVICE — Device

## (undated) DEVICE — SYR ONLY LUER LOCK 20CC